# Patient Record
Sex: FEMALE | Race: WHITE | Employment: UNEMPLOYED | URBAN - METROPOLITAN AREA
[De-identification: names, ages, dates, MRNs, and addresses within clinical notes are randomized per-mention and may not be internally consistent; named-entity substitution may affect disease eponyms.]

---

## 2017-01-02 ENCOUNTER — PATIENT MESSAGE (OUTPATIENT)
Dept: INTERNAL MEDICINE CLINIC | Age: 52
End: 2017-01-02

## 2017-01-02 DIAGNOSIS — N39.3 STRESS INCONTINENCE: ICD-10-CM

## 2017-01-03 RX ORDER — OXYBUTYNIN CHLORIDE 5 MG/1
5 TABLET ORAL DAILY
Qty: 30 TAB | Refills: 11 | Status: SHIPPED | OUTPATIENT
Start: 2017-01-03 | End: 2018-01-18 | Stop reason: SDUPTHER

## 2017-01-05 DIAGNOSIS — Z12.31 ENCOUNTER FOR SCREENING MAMMOGRAM FOR BREAST CANCER: ICD-10-CM

## 2017-01-24 RX ORDER — DICLOFENAC SODIUM 75 MG/1
75 TABLET, DELAYED RELEASE ORAL 2 TIMES DAILY
COMMUNITY
Start: 2017-01-24 | End: 2017-04-19

## 2017-04-19 ENCOUNTER — OFFICE VISIT (OUTPATIENT)
Dept: INTERNAL MEDICINE CLINIC | Age: 52
End: 2017-04-19

## 2017-04-19 ENCOUNTER — HOSPITAL ENCOUNTER (OUTPATIENT)
Dept: LAB | Age: 52
Discharge: HOME | End: 2017-04-19
Payer: MEDICARE

## 2017-04-19 VITALS
TEMPERATURE: 98 F | HEART RATE: 93 BPM | OXYGEN SATURATION: 96 % | RESPIRATION RATE: 21 BRPM | BODY MASS INDEX: 41.23 KG/M2 | HEIGHT: 61 IN | DIASTOLIC BLOOD PRESSURE: 76 MMHG | SYSTOLIC BLOOD PRESSURE: 126 MMHG | WEIGHT: 218.4 LBS

## 2017-04-19 DIAGNOSIS — E03.9 HYPOTHYROIDISM, UNSPECIFIED TYPE: Primary | ICD-10-CM

## 2017-04-19 DIAGNOSIS — E66.01 MORBID OBESITY DUE TO EXCESS CALORIES (HCC): ICD-10-CM

## 2017-04-19 DIAGNOSIS — R73.09 ELEVATED GLUCOSE: ICD-10-CM

## 2017-04-19 DIAGNOSIS — Z51.81 MEDICATION MONITORING ENCOUNTER: ICD-10-CM

## 2017-04-19 PROCEDURE — 84443 ASSAY THYROID STIM HORMONE: CPT

## 2017-04-19 PROCEDURE — 80053 COMPREHEN METABOLIC PANEL: CPT

## 2017-04-19 PROCEDURE — 83036 HEMOGLOBIN GLYCOSYLATED A1C: CPT

## 2017-04-19 PROCEDURE — 85027 COMPLETE CBC AUTOMATED: CPT

## 2017-04-19 RX ORDER — DOXYCYCLINE 100 MG/1
100 CAPSULE ORAL 2 TIMES DAILY
COMMUNITY
End: 2017-11-15 | Stop reason: SDUPTHER

## 2017-04-19 NOTE — PROGRESS NOTES
VIVI Ford is a 45 y.o. female, she presents today for:  Follow-up of weight, hypothyroid. Today Barbara  is again accompanied by her mother. They are in good health and she is feeling that her walking and movement is improved since hip replacement. She continues to feel limited by her right knee pain but less so. She would like to lose weight. Has worked on drinking mainly water. She has 3 prepared meals per day and she generally finishs each plate (served by someone else). She is walking every other day and doing her hip exercises. PMH/PSH: reviewed and updated  Sochx:  reports that she has never smoked. She has never used smokeless tobacco. She reports that she does not drink alcohol or use illicit drugs. Famhx: reviewed and updated     All: No Known Allergies  Med:   Current Outpatient Prescriptions   Medication Sig    doxycycline (VIBRAMYCIN) 100 mg capsule Take 100 mg by mouth two (2) times a day.  oxybutynin (DITROPAN) 5 mg tablet Take 1 Tab by mouth daily.  cloZAPine (CLOZARIL) 100 mg tablet Take 4 Tabs by mouth nightly.  cholecalciferol (VITAMIN D3) 1,000 unit tablet Take 1 Tab by mouth daily.  docusate sodium (COLACE) 100 mg capsule Take 1 Cap by mouth two (2) times a day.  levothyroxine (LEVOXYL) 75 mcg tablet Take 1 Tab by mouth Daily (before breakfast).  polyethylene glycol (MIRALAX) 17 gram/dose powder Take 17 g by mouth two (2) times a day. Hold for lose stool    citalopram (CELEXA) 20 mg tablet Take 1 Tab by mouth daily.  lamoTRIgine (LAMICTAL) 150 mg tablet Take 150 mg by mouth nightly.  lithium carbonate (LITHOBID) 600 mg capsule Take 600 mg by mouth nightly. No current facility-administered medications for this visit. Review of Systems   Constitutional: Negative for chills, fever and weight loss. HENT: Negative for congestion. Respiratory: Negative for cough, shortness of breath and wheezing.     Cardiovascular: Negative for chest pain and leg swelling. Gastrointestinal: Negative for abdominal pain, diarrhea, nausea and vomiting. Genitourinary: Negative for dysuria. Skin: Negative for rash. Neurological: Negative for dizziness and headaches. PE:  Blood pressure 126/76, pulse 93, temperature 98 °F (36.7 °C), temperature source Oral, resp. rate 21, height 5' 1\" (1.549 m), weight 218 lb 6.4 oz (99.1 kg), last menstrual period 06/24/2013, SpO2 96 %. Body mass index is 41.27 kg/(m^2). Physical Exam   Constitutional: She is oriented to person, place, and time. She appears well-developed and well-nourished. No distress. HENT:   Head: Normocephalic. Mouth/Throat: Oropharynx is clear and moist.   Eyes: Conjunctivae and EOM are normal.   Neck: Neck supple. Cardiovascular: Normal rate, regular rhythm and normal heart sounds. Pulmonary/Chest: Effort normal and breath sounds normal.   Neurological: She is alert and oriented to person, place, and time. Skin: Skin is warm and dry. Nursing note and vitals reviewed. moving up and down to table much more easily  Labs:   No results found for any visits on 04/19/17. A/P:  45 y.o. female    ICD-10-CM ICD-9-CM    1. Hypothyroidism, unspecified type E03.9 244.9 TSH 3RD GENERATION   2. Medication monitoring encounter M22.24 I26.74 METABOLIC PANEL, COMPREHENSIVE      CBC W/O DIFF   3. Elevated glucose R73.09 790.29 HEMOGLOBIN A1C WITH EAG   4. Morbid obesity due to excess calories ([facility]) Q93.37 522.97 METABOLIC PANEL, COMPREHENSIVE      CBC W/O DIFF     Hypothyroid: on stable dose of levothyroxine. TSH ordered today given weight gain    Glucose intolerance: mild elevation in A1C likely indicating glucose intolerance. Will continue to monitor today. Morbid obesity: working to cut down on calories. Discussed cutting back on dinner portion and avoiding after dinner snack. Encouarged increased activity. Theses are both hard for Hiwot Herb since her meals are prepared.      Follow-up Disposition:  Return in about 7 months (around 11/19/2017) for well visit.

## 2017-04-19 NOTE — PATIENT INSTRUCTIONS
- Please request that your dinner portion be reduced by about half. - Continue to avoid sugar in your beverages, drink mainly water. - Keep up the good job with your hip exercises. - Try to add in walking every day. - If you can also take 10 minute \"get moving\" breaks like dancing to music, walking in place or doing toe touches that will help get your body stronger and moving. (Please consider scheduling well woman with pap smear with Dr. Abbie Quintero)     Learning About Cutting Calories  How do calories affect your weight? Food gives your body energy. Energy from the food you eat is measured in calories. This energy keeps your heart beating, your brain active, and your muscles working. Your body needs a certain number of calories each day. After your body uses the calories it needs, it stores extra calories as fat. To lose weight safely, you have to eat fewer calories while eating in a healthy way. How many calories do you need each day? The more active you are, the more calories you need. When you are less active, you need fewer calories. How many calories you need each day also depends on several things, including your age and whether you are male or female. Here are some general guidelines for adults:  · Less active women and older adults need 1,600 to 2,000 calories each day. · Active women and less active men need 2,000 to 2,400 calories each day. · Active men need 2,400 to 3,000 calories each day. How can you cut calories and eat healthy meals? Whole grains, vegetables and fruits, and dried beans are good lower-calorie foods. They give you lots of nutrients and fiber. And they fill you up. Sweets, energy drinks, and soda pop are high in calories. They give you few nutrients and no fiber. Try to limit soda pop, fruit juice, and energy drinks. Drink water instead. Some fats can be part of a healthy diet.  But cutting back on fats from highly processed foods like fast foods and many snack foods is a good way to lower the calories in your diet. Also, use smaller amounts of fats like butter, margarine, salad dressing, and mayonnaise. Add fresh garlic, lemon, or herbs to your meals to add flavor without adding fat. Meats and dairy products can be a big source of hidden fats. Try to choose lean or low-fat versions of these products. Fat-free cookies, candies, chips, and frozen treats can still be high in sugar and calories. Some fat-free foods have more calories than regular ones. Eat fat-free treats in moderation, as you would other foods. If your favorite foods are high in fat, salt, sugar, or calories, limit how often you eat them. Eat smaller servings, or look for healthy substitutes. Fill up on fruits, vegetables, and whole grains. Eating at home  · Use meat as a side dish instead of as the main part of your meal.  · Try main dishes that use whole wheat pasta, brown rice, dried beans, or vegetables. · Find ways to cook with little or no fat, such as broiling, steaming, or grilling. · Use cooking spray instead of oil. If you use oil, use a monounsaturated oil, such as canola or olive oil. · Trim fat from meats before you cook them. · Drain off fat after you brown the meat or while you roast it. · Chill soups and stews after you cook them. Then skim the fat off the top after it hardens. Eating out  · Order foods that are broiled or poached rather than fried or breaded. · Cut back on the amount of butter or margarine that you use on bread. · Order sauces, gravies, and salad dressings on the side, and use only a little. · When you order pasta, choose tomato sauce rather than cream sauce. · Ask for salsa with your baked potato instead of sour cream, butter, cheese, or wayne. · Order meals in a small size instead of upgrading to a large. · Share an entree, or take part of your food home to eat as another meal.  · Share appetizers and desserts. Where can you learn more?   Go to http://heather.info/. Enter [phone] [id number] in the search box to learn more about \"Learning About Cutting Calories. \"  Current as of: November 9, 2016  Content Version: 11.2  © 5555-8237 [employer]. Care instructions adapted under license by [employer] (which disclaims liability or warranty for this information). If you have questions about a medical condition or this instruction, always ask your healthcare professional. [place] [address] any warranty or liability for your use of this information.

## 2017-04-20 DIAGNOSIS — E07.9 THYROID DISEASE: Primary | ICD-10-CM

## 2017-04-20 LAB
ALBUMIN SERPL-MCNC: 4.4 G/DL (ref 3.5–5.5)
ALBUMIN/GLOB SERPL: 1.9 {RATIO} (ref 1.2–2.2)
ALP SERPL-CCNC: 129 IU/L (ref 39–117)
ALT SERPL-CCNC: 21 IU/L (ref 0–32)
AST SERPL-CCNC: 17 IU/L (ref 0–40)
BILIRUB SERPL-MCNC: 0.4 MG/DL (ref 0–1.2)
BUN SERPL-MCNC: 13 MG/DL (ref 6–24)
BUN/CREAT SERPL: 15 (ref 9–23)
CALCIUM SERPL-MCNC: 9.9 MG/DL (ref 8.7–10.2)
CHLORIDE SERPL-SCNC: 103 MMOL/L (ref 96–106)
CO2 SERPL-SCNC: 26 MMOL/L (ref 18–29)
CREAT SERPL-MCNC: 0.85 MG/DL (ref 0.57–1)
ERYTHROCYTE [DISTWIDTH] IN BLOOD BY AUTOMATED COUNT: 15 % (ref 12.3–15.4)
EST. AVERAGE GLUCOSE BLD GHB EST-MCNC: 114 MG/DL
GLOBULIN SER CALC-MCNC: 2.3 G/DL (ref 1.5–4.5)
GLUCOSE SERPL-MCNC: 103 MG/DL (ref 65–99)
HBA1C MFR BLD: 5.6 % (ref 4.8–5.6)
HCT VFR BLD AUTO: 40.7 % (ref 34–46.6)
HGB BLD-MCNC: 12.7 G/DL (ref 11.1–15.9)
MCH RBC QN AUTO: 28.7 PG (ref 26.6–33)
MCHC RBC AUTO-ENTMCNC: 31.2 G/DL (ref 31.5–35.7)
MCV RBC AUTO: 92 FL (ref 79–97)
PLATELET # BLD AUTO: 207 X10E3/UL (ref 150–379)
POTASSIUM SERPL-SCNC: 4.2 MMOL/L (ref 3.5–5.2)
PROT SERPL-MCNC: 6.7 G/DL (ref 6–8.5)
RBC # BLD AUTO: 4.42 X10E6/UL (ref 3.77–5.28)
SODIUM SERPL-SCNC: 144 MMOL/L (ref 134–144)
TSH SERPL DL<=0.005 MIU/L-ACNC: 3.71 UIU/ML (ref 0.45–4.5)
WBC # BLD AUTO: 7.7 X10E3/UL (ref 3.4–10.8)

## 2017-04-20 RX ORDER — LEVOTHYROXINE SODIUM 88 UG/1
88 TABLET ORAL
Qty: 30 TAB | Refills: 11 | Status: SHIPPED | OUTPATIENT
Start: 2017-04-20 | End: 2018-04-18 | Stop reason: SDUPTHER

## 2017-08-14 ENCOUNTER — PATIENT MESSAGE (OUTPATIENT)
Dept: INTERNAL MEDICINE CLINIC | Age: 52
End: 2017-08-14

## 2017-08-14 DIAGNOSIS — K59.00 CONSTIPATION, UNSPECIFIED CONSTIPATION TYPE: ICD-10-CM

## 2017-08-15 RX ORDER — POLYETHYLENE GLYCOL 3350 17 G/17G
17 POWDER, FOR SOLUTION ORAL 2 TIMES DAILY
Qty: 850 G | Refills: 11 | Status: SHIPPED | OUTPATIENT
Start: 2017-08-15 | End: 2018-04-18 | Stop reason: SDUPTHER

## 2017-08-17 ENCOUNTER — PATIENT MESSAGE (OUTPATIENT)
Dept: INTERNAL MEDICINE CLINIC | Age: 52
End: 2017-08-17

## 2017-08-17 DIAGNOSIS — L60.2 HYPERTROPHIC TOENAIL: Primary | ICD-10-CM

## 2017-08-18 NOTE — TELEPHONE ENCOUNTER
From: Lina Barrera  To: Rojelio Mosquera MD  Sent: 8/17/2017 10:49 AM EDT  Subject: Referral Request    Good morning, Doctor. Thanks for authorizing the Miralax for [place]. Unfortunately, [facility] sent TWO containers, one brand name and one generic. ... Would you please fax a referral for [place] to Dr. Cassie Lira (podiatrist) Jo. Fax#: [phone]. She needs one for her upcoming appt on 9/26. Thank you! I hope all is well.  ZAIRA.

## 2017-08-23 ENCOUNTER — TELEPHONE (OUTPATIENT)
Dept: INTERNAL MEDICINE CLINIC | Age: 52
End: 2017-08-23

## 2017-08-23 NOTE — TELEPHONE ENCOUNTER
----- Message from [facility] ADELAIDA Majano sent at 8/18/2017  1:09 PM EDT -----  Regarding: RE: Referral Request  Contact: [phone]  Thank you very much! ANNIKA  ----- Message -----  From: Rojelio Mosquera MD  Sent: 8/18/2017 12:26 PM EDT  To: [facility] ADELAIDA Gretel  Subject: RE: Referral Request    I went ahead and gave the referral information to our coordinator so we can get approval.     Esvin Melo      ----- Message -----     From: [facility] BALTAZARVenancio Arndt     Sent: 8/17/2017 10:49 AM EDT       To: Rojelio Mosquera MD  Subject: Referral Request    Good morning, Doctor. Thanks for authorizing the Miralax for [place]. Unfortunately, [facility] sent TWO containers, one brand name and one generic. ... Would you please fax a referral for [place] to Dr. Cassie Lira (podiatrist) Jo. Fax#: [phone]. She needs one for her upcoming appt on 9/26. Thank you! I hope all is well.  ELBERT

## 2017-10-17 DIAGNOSIS — K59.00 CONSTIPATION, UNSPECIFIED CONSTIPATION TYPE: ICD-10-CM

## 2017-10-17 NOTE — TELEPHONE ENCOUNTER
[address] [facility]  [phone]    [facility] is requesting a refill of Colace 100 mg capsule    LOV 04/19/17  NOv 11/15/17

## 2017-10-19 RX ORDER — DOCUSATE SODIUM 100 MG/1
100 CAPSULE, LIQUID FILLED ORAL 2 TIMES DAILY
Qty: 60 CAP | Refills: 11 | Status: SHIPPED | OUTPATIENT
Start: 2017-10-19 | End: 2018-04-18 | Stop reason: SDUPTHER

## 2017-11-15 ENCOUNTER — HOSPITAL ENCOUNTER (OUTPATIENT)
Dept: LAB | Age: 52
Discharge: HOME | End: 2017-11-15
Payer: MEDICARE

## 2017-11-15 ENCOUNTER — OFFICE VISIT (OUTPATIENT)
Dept: INTERNAL MEDICINE CLINIC | Age: 52
End: 2017-11-15

## 2017-11-15 VITALS
HEIGHT: 61 IN | BODY MASS INDEX: 41.91 KG/M2 | TEMPERATURE: 98.1 F | WEIGHT: 222 LBS | HEART RATE: 94 BPM | OXYGEN SATURATION: 92 % | RESPIRATION RATE: 24 BRPM | DIASTOLIC BLOOD PRESSURE: 66 MMHG | SYSTOLIC BLOOD PRESSURE: 102 MMHG

## 2017-11-15 DIAGNOSIS — F33.42 RECURRENT MAJOR DEPRESSIVE DISORDER, IN FULL REMISSION (HCC): ICD-10-CM

## 2017-11-15 DIAGNOSIS — M17.32 POST-TRAUMATIC OSTEOARTHRITIS OF LEFT KNEE: ICD-10-CM

## 2017-11-15 DIAGNOSIS — F31.9 BIPOLAR 1 DISORDER (HCC): ICD-10-CM

## 2017-11-15 DIAGNOSIS — E03.9 HYPOTHYROIDISM, UNSPECIFIED TYPE: ICD-10-CM

## 2017-11-15 DIAGNOSIS — Z00.00 MEDICARE ANNUAL WELLNESS VISIT, SUBSEQUENT: Primary | ICD-10-CM

## 2017-11-15 DIAGNOSIS — E55.9 VITAMIN D DEFICIENCY: ICD-10-CM

## 2017-11-15 DIAGNOSIS — Z87.820 HISTORY OF TRAUMATIC BRAIN INJURY: ICD-10-CM

## 2017-11-15 DIAGNOSIS — Z80.0 FAMILY HISTORY OF COLON CANCER: ICD-10-CM

## 2017-11-15 DIAGNOSIS — Z23 ENCOUNTER FOR IMMUNIZATION: ICD-10-CM

## 2017-11-15 DIAGNOSIS — G47.9 SLEEP DISORDER: ICD-10-CM

## 2017-11-15 DIAGNOSIS — K59.00 CONSTIPATION, UNSPECIFIED CONSTIPATION TYPE: ICD-10-CM

## 2017-11-15 DIAGNOSIS — Z13.220 SCREENING FOR LIPID DISORDERS: ICD-10-CM

## 2017-11-15 DIAGNOSIS — R73.01 ELEVATED FASTING GLUCOSE: ICD-10-CM

## 2017-11-15 DIAGNOSIS — M16.9 OSTEOARTHRITIS OF HIP, UNSPECIFIED LATERALITY, UNSPECIFIED OSTEOARTHRITIS TYPE: ICD-10-CM

## 2017-11-15 DIAGNOSIS — E66.01 MORBID OBESITY (HCC): ICD-10-CM

## 2017-11-15 PROCEDURE — 84443 ASSAY THYROID STIM HORMONE: CPT

## 2017-11-15 PROCEDURE — 80061 LIPID PANEL: CPT

## 2017-11-15 PROCEDURE — 83036 HEMOGLOBIN GLYCOSYLATED A1C: CPT

## 2017-11-15 PROCEDURE — 82306 VITAMIN D 25 HYDROXY: CPT

## 2017-11-15 PROCEDURE — 80053 COMPREHEN METABOLIC PANEL: CPT

## 2017-11-15 PROCEDURE — 80178 ASSAY OF LITHIUM: CPT

## 2017-11-15 NOTE — PROGRESS NOTES
VIVI Ford is a 45 y.o. female, she presents today for:    Constipation: currently well controlled. Feels that she eats to much. Notes that she is drinking ice tea and water. Takes walk every day for exercise. Takes oxybutin for bladder. No bladder infections. Going to dentist today. Feel safe in household. Lives in group home. Is supported by mother (who is having some health concerns). PMH/PSH: reviewed and updated  Sochx:  reports that she has never smoked. She has never used smokeless tobacco. She reports that she does not drink alcohol or use illicit drugs. Famhx: reviewed and updated     All: No Known Allergies  Med:   Current Outpatient Prescriptions   Medication Sig    docusate sodium (COLACE) 100 mg capsule Take 1 Cap by mouth two (2) times a day.  polyethylene glycol (MIRALAX) 17 gram/dose powder Take 17 g by mouth two (2) times a day. Hold for lose stool    levothyroxine (SYNTHROID) 88 mcg tablet Take 1 Tab by mouth Daily (before breakfast).  oxybutynin (DITROPAN) 5 mg tablet Take 1 Tab by mouth daily.  cloZAPine (CLOZARIL) 100 mg tablet Take 4 Tabs by mouth nightly.  cholecalciferol (VITAMIN D3) 1,000 unit tablet Take 1 Tab by mouth daily.  citalopram (CELEXA) 20 mg tablet Take 1 Tab by mouth daily.  lamoTRIgine (LAMICTAL) 150 mg tablet Take 150 mg by mouth nightly.  lithium carbonate (LITHOBID) 600 mg capsule Take 600 mg by mouth nightly.  doxycycline (VIBRAMYCIN) 100 mg capsule Take 100 mg by mouth two (2) times a day. No current facility-administered medications for this visit. Review of Systems   Constitutional: Negative for chills and fever. HENT: Negative for hearing loss. Respiratory: Negative for cough, shortness of breath and wheezing. Cardiovascular: Negative for chest pain. Gastrointestinal: Negative for abdominal pain, heartburn, nausea and vomiting. Musculoskeletal: Negative for myalgias.         Knee pain, though improved from prior   Neurological: Negative for dizziness and headaches. PE:  Blood pressure 102/66, pulse 94, temperature 98.1 °F (36.7 °C), temperature source Oral, resp. rate 24, height 5' 1\" (1.549 m), weight 222 lb (100.7 kg), last menstrual period 06/24/2013, SpO2 92 %. Body mass index is 41.95 kg/(m^2). Physical Exam   Constitutional: She is oriented to person, place, and time. She appears well-developed and well-nourished. No distress. HENT:   Head: Normocephalic. Mouth/Throat: Oropharynx is clear and moist.   Eyes: Conjunctivae and EOM are normal.   Neck: Neck supple. No tracheal deviation present. No thyromegaly present. Cardiovascular: Normal rate, regular rhythm and normal heart sounds. Pulmonary/Chest: Effort normal and breath sounds normal.   Abdominal: Soft. Bowel sounds are normal. She exhibits no distension. Musculoskeletal: She exhibits no edema. Lymphadenopathy:     She has no cervical adenopathy. Neurological: She is alert and oriented to person, place, and time. Skin: Skin is warm and dry. Psychiatric:   Flat affect. Oriented to self, time and place. Overall quiet but answers questions appropriately, normal thought content. Pleasant and cooperative. No aggitation. Nursing note and vitals reviewed. Labs: see addendum    A/P:  45 y.o. female    ICD-10-CM ICD-9-CM    1. Hypothyroidism, unspecified type E03.9 244.9 TSH 3RD GENERATION   2. Post-traumatic osteoarthritis of left knee M17.32 715.26    3. Vitamin D deficiency E55.9 268.9 VITAMIN D, 25 HYDROXY   4. Constipation, unspecified constipation type K59.00 564.00    5. Bipolar 1 disorder (HCC) F31.9 296.7 LITHIUM      METABOLIC PANEL, COMPREHENSIVE   6. Morbid obesity (HCC) L92.95 585.09 METABOLIC PANEL, COMPREHENSIVE   7. Recurrent major depressive disorder, in full remission ([facility] E32.08 993.69 METABOLIC PANEL, COMPREHENSIVE   8.  Elevated fasting glucose R73.01 790.21 HEMOGLOBIN A1C WITH EAG METABOLIC PANEL, COMPREHENSIVE   9. Sleep disorder G47.9 780.50    10. Family history of colon cancer Z80.0 V16.0    6. History of traumatic brain injury Z87.820 V15.52    12. Osteoarthritis of hip, unspecified laterality, unspecified osteoarthritis type M16.9 715.95    13. Medicare annual wellness visit, subsequent Z00.00 V70.0    14. Encounter for immunization Z23 V03.89    15. Screening for lipid disorders Z13.220 V77.91 LIPID PANEL     Hypothyroid: on stable dose of levothyroxine. TSH requested for monitoring.     Elevated A1C:  Mild, not on metformin. Continue to focus on avoiding excess sugar, exercise and weight control.      Morbid obesity: This is a long term struggle for Enrrique. While she is aware that she needs to eat less, it is difficult in her setting and situation. Vit D deficiency: repeat level to ensure at goal.     Bipolar type 1 disorder: stable and followed with psychiatrist.    Left knee osteoarthritis: improved movement and pain after left hip replacement. Continue to follow-up with Dr. Suhas Thompson as needed. - She was given AVS and expressed understanding with the diagnosis and plan as discussed. Follow-up Disposition:  Return in about 6 months (around 5/15/2018) for follow-up hypothyroid, weight. .  Future Appointments  Date Time Provider Mery Brown   4/18/2018 8:30 AM Bay Espinosa MD [facility]       This is a Subsequent Medicare Annual Wellness Exam (AWV) (Performed 12 months after IPPE or effective date of Medicare Part B enrollment)    I have reviewed the patient's medical history in detail and updated the computerized patient record. History     Past Medical History:   Diagnosis Date    Arthritis     Bipolar 1 disorder ([facility] 12/17/2012    Cervical spine fracture (HCC)     Cervical spine fracture (HCC)     Cyst of breast, right, solitary 11/18/2015    mammogram from 11/13/2015, Sue Patient found to have complex cyst in right breast measuring 8mm. 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 60 Cap 11    polyethylene glycol (MIRALAX) 17 gram/dose powder Take 17 g by mouth two (2) times a day. Hold for lose stool 850 g 11    levothyroxine (SYNTHROID) 88 mcg tablet Take 1 Tab by mouth Daily (before breakfast). 30 Tab 11    oxybutynin (DITROPAN) 5 mg tablet Take 1 Tab by mouth daily. 30 Tab 11    cloZAPine (CLOZARIL) 100 mg tablet Take 4 Tabs by mouth nightly.  cholecalciferol (VITAMIN D3) 1,000 unit tablet Take 1 Tab by mouth daily. 30 Tab 11    citalopram (CELEXA) 20 mg tablet Take 1 Tab by mouth daily. 30 Tab 5    lamoTRIgine (LAMICTAL) 150 mg tablet Take 150 mg by mouth nightly.  lithium carbonate (LITHOBID) 600 mg capsule Take 600 mg by mouth nightly.  doxycycline (VIBRAMYCIN) 100 mg capsule Take 100 mg by mouth two (2) times a day.        No Known Allergies  Family History   Problem Relation Age of Onset    Asthma Mother     Asthma Father    [employer] Alzheimer Father     No Known Problems Brother      Social History   Substance Use Topics    Smoking status: Never Smoker    Smokeless tobacco: Never Used    Alcohol use No     Patient Active Problem List   Diagnosis Code    Hypothyroidism E03.9    Sleep disorder G47.9    Bipolar 1 disorder ([facility] F31.9    Vitamin D deficiency E55.9    OA (osteoarthritis) of knee M17.10    Constipation K59.00    Morbid obesity ([facility] E66.01    Elevated fasting glucose R73.01    Degenerative joint disease (DJD) of hip M16.9    History of traumatic brain injury Z87.820    Family history of colon cancer Z80.0    Depression F32.9       Depression Risk Factor Screening:     PHQ over the last two weeks 11/15/2017   Little interest or pleasure in doing things Not at all   Feeling down, depressed or hopeless Not at all   Total Score PHQ 2 0     Alcohol Risk Factor Screening: You do not drink alcohol or very rarely. Functional Ability and Level of Safety:   Hearing Loss  Hearing is good. Activities of Daily Living  The home contains: no safety equipment. Patient does total self care    Fall Risk  Fall Risk Assessment, last 12 mths 11/15/2017   Able to walk? Yes   Fall in past 12 months? No     Abuse Screen  Patient is not abused  feels safe in current environment. has support through family as well as group home    Cognitive Screening   Evaluation of Cognitive Function:  Has your family/caregiver stated any concerns about your memory: no  Baseline cognitive changes since head trauma and related to mental health.      Patient Care Team   Patient Care Team:  Julian Mace MD as PCP - General (Internal Medicine)  ALICIA Mann MD (Orthopedic Surgery)    Assessment/Plan   Education and counseling provided:  Are appropriate based on today's review and evaluation  Influenza Vaccine  Screening Mammography  Screening Pap and pelvic (covered once every 2 years)  Colorectal cancer screening tests    Well woman (non-gyn) exam: history and exam revealed issues as noted below. Cancer screening: mammo and pap done with gyn. Colonoscopy done  Vaccine status: up to date   Cardiovascular risk: BP well controlled, lipid screen. Bone health: daily vit D supplement.    Diet and Exercise: not drinking sugary beverages    Health Maintenance Due   Topic Date Due    PAP AKA CERVICAL CYTOLOGY  10/20/2017

## 2017-11-15 NOTE — PATIENT INSTRUCTIONS
Medicare Wellness Visit, Female    The best way to live healthy is to have a healthy lifestyle by eating a well-balanced diet, exercising regularly, limiting alcohol and stopping smoking. Regular physical exams and screening tests are another way to keep healthy. Preventive exams provided by your health care provider can find health problems before they become diseases or illnesses. Preventive services including immunizations, screening tests, monitoring and exams can help you take care of your own health. All people over age 72 should have a pneumovax  and and a prevnar shot to prevent pneumonia. These are once in a lifetime unless you and your provider decide differently. All people over 65 should have a yearly flu shot and a tetanus vaccine every 10 years. A bone mass density to screen for osteoporosis or thinning of the bones should be done every 2 years after 65. Screening for diabetes mellitus with a blood sugar test should be done every year. Glaucoma is a disease of the eye due to increased ocular pressure that can lead to blindness and it should be done every year by an eye professional.    Cardiovascular screening tests that check for elevated lipids (fatty part of blood) which can lead to heart disease and strokes should be done every 5 years. Colorectal screening that evaluates for blood or polyps in your colon should be done yearly as a stool test or every five years as a flexible sigmoidoscope or every 10 years as a colonoscopy up to age 76. Breast cancer screening with a mammogram is recommended biennially  for women age 54-69. Screening for cervical cancer with a pap smear and pelvic exam is recommended for women after age 72 years every 2 years up to age 79 or when the provider and patient decide to stop. If there is a history of cervical abnormalities or other increased risk for cancer then the test is recommended yearly.     Hepatitis C screening is also recommended for anyone born between [birth year] through [address]. A shingles vaccine is also recommended once in a lifetime after age 61. Your Medicare Wellness Exam is recommended annually. Here is a list of your current Health Maintenance items with a due date:  Health Maintenance Due   Topic Date Due    Cervical Cancer Screening  10/20/2017          Knee Arthritis: Exercises  Your Care Instructions  Here are some examples of exercises for knee arthritis. Start each exercise slowly. Ease off the exercise if you start to have pain. Your doctor or physical therapist will tell you when you can start these exercises and which ones will work best for you. How to do the exercises  Knee flexion with heel slide    1. Lie on your back with your knees bent. 2. Slide your heel back by bending your affected knee as far as you can. Then hook your other foot around your ankle to help pull your heel even farther back. 3. Hold for about 6 seconds, then rest for up to 10 seconds. 4. Repeat 8 to 12 times. 5. Switch legs and repeat steps 1 through 4, even if only one knee is sore. Quad sets    1. Sit with your affected leg straight and supported on the floor or a firm bed. Place a small, rolled-up towel under your knee. Your other leg should be bent, with that foot flat on the floor. 2. Tighten the thigh muscles of your affected leg by pressing the back of your knee down into the towel. 3. Hold for about 6 seconds, then rest for up to 10 seconds. 4. Repeat 8 to 12 times. 5. Switch legs and repeat steps 1 through 4, even if only one knee is sore. Straight-leg raises to the front    1. Lie on your back with your good knee bent so that your foot rests flat on the floor. Your affected leg should be straight. Make sure that your low back has a normal curve. You should be able to slip your hand in between the floor and the small of your back, with your palm touching the floor and your back touching the back of your hand.   2. Tighten the thigh muscles in your affected leg by pressing the back of your knee flat down to the floor. Hold your knee straight. 3. Keeping the thigh muscles tight and your leg straight, lift your affected leg up so that your heel is about 12 inches off the floor. Hold for about 6 seconds, then lower slowly. 4. Relax for up to 10 seconds between repetitions. 5. Repeat 8 to 12 times. 6. Switch legs and repeat steps 1 through 5, even if only one knee is sore. Active knee flexion    1. Lie on your stomach with your knees straight. If your kneecap is uncomfortable, roll up a washcloth and put it under your leg just above your kneecap. 2. Lift the foot of your affected leg by bending the knee so that you bring the foot up toward your buttock. If this motion hurts, try it without bending your knee quite as far. This may help you avoid any painful motion. 3. Slowly move your leg up and down. 4. Repeat 8 to 12 times. 5. Switch legs and repeat steps 1 through 4, even if only one knee is sore. Quadriceps stretch (facedown)    1. Lie flat on your stomach, and rest your face on the floor. 2. Wrap a towel or belt strap around the lower part of your affected leg. Then use the towel or belt strap to slowly pull your heel toward your buttock until you feel a stretch. 3. Hold for about 15 to 30 seconds, then relax your leg against the towel or belt strap. 4. Repeat 2 to 4 times. 5. Switch legs and repeat steps 1 through 4, even if only one knee is sore. Stationary exercise bike    1. If you do not have a stationary exercise bike at home, you can find one to ride at your local health club or community center. 2. Adjust the height of the bike seat so that your knee is slightly bent when your leg is extended downward. If your knee hurts when the pedal reaches the top, you can raise the seat so that your knee does not bend as much. 3. Start slowly. At first, try to do 5 to 10 minutes of cycling with little to no resistance.  Then increase your time and the resistance bit by bit until you can do 20 to 30 minutes without pain. 4. If you start to have pain, rest your knee until your pain gets back to the level that is normal for you. Or cycle for less time or with less effort. Follow-up care is a key part of your treatment and safety. Be sure to make and go to all appointments, and call your doctor if you are having problems. It's also a good idea to know your test results and keep a list of the medicines you take. Where can you learn more? Go to http://juan-maryellne.info/. Enter C159 in the search box to learn more about \"Knee Arthritis: Exercises. \"  Current as of: March 21, 2017  Content Version: 11.4  © 5656-0649 [employer]. Care instructions adapted under license by [employer] (which disclaims liability or warranty for this information). If you have questions about a medical condition or this instruction, always ask your healthcare professional. [address] any warranty or liability for your use of this information.

## 2017-11-15 NOTE — LETTER
11/15/2017 9:19 AM 
 
Ms. Blair Riley 
[address] [place]  [id number] To Whom It May Concern: 
 
Blair Riley is currently under the care of [facility]. Current Outpatient Prescriptions on File Prior to Visit Medication Sig Dispense Refill  docusate sodium (COLACE) 100 mg capsule Take 1 Cap by mouth two (2) times a day. 60 Cap 11  
 polyethylene glycol (MIRALAX) 17 gram/dose powder Take 17 g by mouth two (2) times a day. Hold for lose stool 850 g 11  
 levothyroxine (SYNTHROID) 88 mcg tablet Take 1 Tab by mouth Daily (before breakfast). 30 Tab 11  
 oxybutynin (DITROPAN) 5 mg tablet Take 1 Tab by mouth daily. 30 Tab 11  
 cloZAPine (CLOZARIL) 100 mg tablet Take 4 Tabs by mouth nightly.  cholecalciferol (VITAMIN D3) 1,000 unit tablet Take 1 Tab by mouth daily. 30 Tab 11  
 citalopram (CELEXA) 20 mg tablet Take 1 Tab by mouth daily. 30 Tab 5  lamoTRIgine (LAMICTAL) 150 mg tablet Take 150 mg by mouth nightly.  lithium carbonate (LITHOBID) 600 mg capsule Take 600 mg by mouth nightly. No current facility-administered medications on file prior to visit. Past Medical History:  
Diagnosis Date  Arthritis  Bipolar 1 disorder ([facility] [address] 12/17/2012  Cervical spine fracture (HCC)  Cervical spine fracture (HCC)  Cyst of breast, right, solitary 11/18/2015  
 mammogram from 11/13/2015, Sue Patient found to have complex cyst in right breast measuring 8mm. Biopsy planned for 11/19/2015  Depression   
 psychotic  Diverticulosis 4/7/14  
 no polyps  Endocrine disease   
 hypothyoidism  Family history of colon cancer  Fibroids 10/14/2009  Fibrosis of uterus  Lumbar vertebral fracture (HCC)  Other and unspecified symptoms and signs involving general sensations and perceptions   
 syncope, BRAIN TRAUMA FROM FALL SEVEN YEARS AGO  Other ill-defined conditions(799.89)   
 uterine fibrosis  Patella fracture  Psychiatric disorder   
 depression, bipolar  Psychotic depressive reaction ([facility] [address] 10/14/2009  Syncope  TBI (traumatic brain injury) ([facility] [address]  Thyroid disease   
 hypothyroidism w functioning thyroid nodule If there are questions or concerns please have the patient contact our office.  
 
 
 
Sincerely, 
 
 
Elodia Burnett MD

## 2017-11-15 NOTE — MR AVS SNAPSHOT
Visit Information Date & Time Provider Department Dept. Phone Encounter #  
 11/15/2017  8:30 AM Jen Cosme MD [address] [place] [facility] [phone] [id number] Follow-up Instructions Return in about 6 months (around 5/15/2018) for follow-up hypothyroid, weight. Nancy Peralta Upcoming Health Maintenance Date Due  
 PAP AKA CERVICAL CYTOLOGY 10/20/2017 BREAST CANCER SCRN MAMMOGRAM 12/19/2018 COLONOSCOPY 4/7/2019 DTaP/Tdap/Td series (2 - Td) 4/20/2025 Allergies as of 11/15/2017  Review Complete On: 11/15/2017 By: Jen Cosme MD  
 No Known Allergies Current Immunizations  Reviewed on 11/15/2017 Name Date Influenza Vaccine 11/3/2014 Influenza Vaccine (Quad) PF 10/3/2016, 10/21/2015, 10/16/2013 PPD 10/14/2009 TB Skin Test (PPD) Intradermal 10/3/2016, 10/21/2015, 10/20/2014, 10/16/2013  9:30 AM  
 Tdap 4/20/2015 Reviewed by Jen Cosme MD on 11/15/2017 at  8:52 AM  
You Were Diagnosed With   
  
 Codes Comments Hypothyroidism, unspecified type    -  Primary ICD-10-CM: E03.9 ICD-9-CM: 244.9 Post-traumatic osteoarthritis of left knee     ICD-10-CM: M17.32 
ICD-9-CM: 715.26 Vitamin D deficiency     ICD-10-CM: E55.9 ICD-9-CM: 268.9 Constipation, unspecified constipation type     ICD-10-CM: K59.00 ICD-9-CM: 564.00 Bipolar 1 disorder (HCC)     ICD-10-CM: F31.9 ICD-9-CM: 296.7 Morbid obesity ([facility] [address]     ICD-10-CM: E66.01 
ICD-9-CM: 278.01 Recurrent major depressive disorder, in full remission ([facility] [address]     ICD-10-CM: F33.42 
ICD-9-CM: 296.36 Elevated fasting glucose     ICD-10-CM: R73.01 
ICD-9-CM: 790.21 Sleep disorder     ICD-10-CM: G47.9 ICD-9-CM: 780.50 Family history of colon cancer     ICD-10-CM: Z80.0 ICD-9-CM: V16.0 History of traumatic brain injury     ICD-10-CM: Z87.820 ICD-9-CM: V15.52  Osteoarthritis of hip, unspecified laterality, unspecified osteoarthritis type     ICD-10-CM: M16.9 ICD-9-CM: 715.95 Medicare annual wellness visit, subsequent     ICD-10-CM: Z00.00 ICD-9-CM: V70.0 Encounter for immunization     ICD-10-CM: A85 ICD-9-CM: V03.89 Screening for lipid disorders     ICD-10-CM: Z13.220 ICD-9-CM: V77.91 Vitals BP Pulse Temp Resp Height(growth percentile) Weight(growth percentile) 102/66 (BP 1 Location: Right arm, BP Patient Position: Sitting) 94 98.1 °F (36.7 °C) (Oral) 24 5' 1\" (1.549 m) 222 lb (100.7 kg) LMP SpO2 BMI OB Status Smoking Status 06/24/2013 92% 41.95 kg/m2 Postmenopausal Never Smoker BMI and BSA Data Body Mass Index Body Surface Area 41.95 kg/m 2 2.08 m 2 Preferred Pharmacy Pharmacy Name [phone] [facility] [phone] Your Updated Medication List  
  
   
This list is accurate as of: 11/15/17  9:14 AM.  Always use your most recent med list.  
  
  
  
  
 cholecalciferol 1,000 unit tablet Commonly known as:  VITAMIN D3 Take 1 Tab by mouth daily. citalopram 20 mg tablet Commonly known as:  Yogesh Inoue Take 1 Tab by mouth daily. cloZAPine 100 mg tablet Commonly known as:  CLOZARIL Take 4 Tabs by mouth nightly. docusate sodium 100 mg capsule Commonly known as:  Adrianne Alexia Take 1 Cap by mouth two (2) times a day. LaMICtal 150 mg tablet Generic drug:  lamoTRIgine Take 150 mg by mouth nightly. levothyroxine 88 mcg tablet Commonly known as:  LEVOXYL Take 1 Tab by mouth Daily (before breakfast). lithium carbonate 600 mg capsule Take 600 mg by mouth nightly. oxybutynin 5 mg tablet Commonly known as:  NDMVZMIE Take 1 Tab by mouth daily. polyethylene glycol 17 gram/dose powder Commonly known as:  Henrey Ely Take 17 g by mouth two (2) times a day. Hold for lose stool We Performed the Following HEMOGLOBIN A1C WITH EAG [99175 CPT(R)] LIPID PANEL [92812 CPT(R)] LITHIUM [id number] CPT(R)] METABOLIC PANEL, COMPREHENSIVE [23008 CPT(R)] TSH 3RD GENERATION [49091 CPT(R)] VITAMIN D, 25 HYDROXY [id number] CPT(R)] Follow-up Instructions Return in about 6 months (around 5/15/2018) for follow-up hypothyroid, weight. .  
  
  
Patient Instructions Medicare Wellness Visit, Female The best way to live healthy is to have a healthy lifestyle by eating a well-balanced diet, exercising regularly, limiting alcohol and stopping smoking. Regular physical exams and screening tests are another way to keep healthy. Preventive exams provided by your health care provider can find health problems before they become diseases or illnesses. Preventive services including immunizations, screening tests, monitoring and exams can help you take care of your own health. All people over age 72 should have a pneumovax  and and a prevnar shot to prevent pneumonia. These are once in a lifetime unless you and your provider decide differently. All people over 65 should have a yearly flu shot and a tetanus vaccine every 10 years. A bone mass density to screen for osteoporosis or thinning of the bones should be done every 2 years after 65. Screening for diabetes mellitus with a blood sugar test should be done every year. Glaucoma is a disease of the eye due to increased ocular pressure that can lead to blindness and it should be done every year by an eye professional. 
 
Cardiovascular screening tests that check for elevated lipids (fatty part of blood) which can lead to heart disease and strokes should be done every 5 years. Colorectal screening that evaluates for blood or polyps in your colon should be done yearly as a stool test or every five years as a flexible sigmoidoscope or every 10 years as a colonoscopy up to age 76. Breast cancer screening with a mammogram is recommended biennially  for women age 54-69. Screening for cervical cancer with a pap smear and pelvic exam is recommended for women after age 72 years every 2 years up to age 79 or when the provider and patient decide to stop. If there is a history of cervical abnormalities or other increased risk for cancer then the test is recommended yearly. Hepatitis C screening is also recommended for anyone born between 80 through [address]. A shingles vaccine is also recommended once in a lifetime after age 61. Your Medicare Wellness Exam is recommended annually. Here is a list of your current Health Maintenance items with a due date: 
Health Maintenance Due Topic Date Due  Cervical Cancer Screening  10/20/2017 Knee Arthritis: Exercises Your Care Instructions Here are some examples of exercises for knee arthritis. Start each exercise slowly. Ease off the exercise if you start to have pain. Your doctor or physical therapist will tell you when you can start these exercises and which ones will work best for you. How to do the exercises Knee flexion with heel slide 1. Lie on your back with your knees bent. 2. Slide your heel back by bending your affected knee as far as you can. Then hook your other foot around your ankle to help pull your heel even farther back. 3. Hold for about 6 seconds, then rest for up to 10 seconds. 4. Repeat 8 to 12 times. 5. Switch legs and repeat steps 1 through 4, even if only one knee is sore. Veset 1. Sit with your affected leg straight and supported on the floor or a firm bed. Place a small, rolled-up towel under your knee. Your other leg should be bent, with that foot flat on the floor. 2. Tighten the thigh muscles of your affected leg by pressing the back of your knee down into the towel. 3. Hold for about 6 seconds, then rest for up to 10 seconds. 4. Repeat 8 to 12 times. 5. Switch legs and repeat steps 1 through 4, even if only one knee is sore. Straight-leg raises to the front 1. Lie on your back with your good knee bent so that your foot rests flat on the floor. Your affected leg should be straight. Make sure that your low back has a normal curve. You should be able to slip your hand in between the floor and the small of your back, with your palm touching the floor and your back touching the back of your hand. 2. Tighten the thigh muscles in your affected leg by pressing the back of your knee flat down to the floor. Hold your knee straight. 3. Keeping the thigh muscles tight and your leg straight, lift your affected leg up so that your heel is about 12 inches off the floor. Hold for about 6 seconds, then lower slowly. 4. Relax for up to 10 seconds between repetitions. 5. Repeat 8 to 12 times. 6. Switch legs and repeat steps 1 through 5, even if only one knee is sore. Active knee flexion 1. Lie on your stomach with your knees straight. If your kneecap is uncomfortable, roll up a washcloth and put it under your leg just above your kneecap. 2. Lift the foot of your affected leg by bending the knee so that you bring the foot up toward your buttock. If this motion hurts, try it without bending your knee quite as far. This may help you avoid any painful motion. 3. Slowly move your leg up and down. 4. Repeat 8 to 12 times. 5. Switch legs and repeat steps 1 through 4, even if only one knee is sore. Quadriceps stretch (facedown) 1. Lie flat on your stomach, and rest your face on the floor. 2. Wrap a towel or belt strap around the lower part of your affected leg. Then use the towel or belt strap to slowly pull your heel toward your buttock until you feel a stretch. 3. Hold for about 15 to 30 seconds, then relax your leg against the towel or belt strap. 4. Repeat 2 to 4 times. 5. Switch legs and repeat steps 1 through 4, even if only one knee is sore. Stationary exercise bike 1.  If you do not have a stationary exercise bike at home, you can find one to ride at your local health club or community center. 2. Adjust the height of the bike seat so that your knee is slightly bent when your leg is extended downward. If your knee hurts when the pedal reaches the top, you can raise the seat so that your knee does not bend as much. 3. Start slowly. At first, try to do 5 to 10 minutes of cycling with little to no resistance. Then increase your time and the resistance bit by bit until you can do 20 to 30 minutes without pain. 4. If you start to have pain, rest your knee until your pain gets back to the level that is normal for you. Or cycle for less time or with less effort. Follow-up care is a key part of your treatment and safety. Be sure to make and go to all appointments, and call your doctor if you are having problems. It's also a good idea to know your test results and keep a list of the medicines you take. Where can you learn more? Go to http://juan-maryellen.info/. Enter C159 in the search box to learn more about \"Knee Arthritis: Exercises. \" Current as of: March 21, 2017 Content Version: 11.4 © 5085-2332 [employer]. Care instructions adapted under license by [employer] (which disclaims liability or warranty for this information). If you have questions about a medical condition or this instruction, always ask your healthcare professional. [address] any warranty or liability for your use of this information. Introducing [facility]! Dear [place]: 
Thank you for requesting a EasyProve account. Our records indicate that you already have an active EasyProve account. You can access your account anytime at [email or web address]. [email or web address] Did you know that you can access your hospital and ER discharge instructions at any time in EasyProve? You can also review all of your test results from your hospital stay or ER visit. Additional Information If you have questions, please visit the Frequently Asked Questions section of the [email or web address] website at [email or web address] Remember, [employer] is NOT to be used for urgent needs. For medical emergencies, dial 911. Now available from your iPhone and Android! Please provide this summary of care documentation to your next provider. Your primary care clinician is listed as Narciso Paul. If you have any questions after today's visit, please call [phone].

## 2017-11-16 LAB
25(OH)D3+25(OH)D2 SERPL-MCNC: 31 NG/ML (ref 30–100)
ALBUMIN SERPL-MCNC: 4.6 G/DL (ref 3.5–5.5)
ALBUMIN/GLOB SERPL: 2 {RATIO} (ref 1.2–2.2)
ALP SERPL-CCNC: 136 IU/L (ref 39–117)
ALT SERPL-CCNC: 23 IU/L (ref 0–32)
AST SERPL-CCNC: 18 IU/L (ref 0–40)
BILIRUB SERPL-MCNC: 0.4 MG/DL (ref 0–1.2)
BUN SERPL-MCNC: 9 MG/DL (ref 6–24)
BUN/CREAT SERPL: 10 (ref 9–23)
CALCIUM SERPL-MCNC: 10.1 MG/DL (ref 8.7–10.2)
CHLORIDE SERPL-SCNC: 102 MMOL/L (ref 96–106)
CHOLEST SERPL-MCNC: 159 MG/DL (ref 100–199)
CO2 SERPL-SCNC: 29 MMOL/L (ref 18–29)
CREAT SERPL-MCNC: 0.91 MG/DL (ref 0.57–1)
EST. AVERAGE GLUCOSE BLD GHB EST-MCNC: 114 MG/DL
GLOBULIN SER CALC-MCNC: 2.3 G/DL (ref 1.5–4.5)
GLUCOSE SERPL-MCNC: 103 MG/DL (ref 65–99)
HBA1C MFR BLD: 5.6 % (ref 4.8–5.6)
HDLC SERPL-MCNC: 47 MG/DL
LDLC SERPL CALC-MCNC: 88 MG/DL (ref 0–99)
LITHIUM SERPL-SCNC: 0.5 MMOL/L (ref 0.6–1.2)
POTASSIUM SERPL-SCNC: 4.5 MMOL/L (ref 3.5–5.2)
PROT SERPL-MCNC: 6.9 G/DL (ref 6–8.5)
SODIUM SERPL-SCNC: 144 MMOL/L (ref 134–144)
TRIGL SERPL-MCNC: 119 MG/DL (ref 0–149)
TSH SERPL DL<=0.005 MIU/L-ACNC: 1.58 UIU/ML (ref 0.45–4.5)
VLDLC SERPL CALC-MCNC: 24 MG/DL (ref 5–40)

## 2017-11-17 NOTE — PROGRESS NOTES
A1C continues to show well regulated blood sugars. Metabolic profile revelas normal kidney function and normal liver function. Mild elevation of alkaline phosphatase seen for last 2 years, increased. May represent increased bone turn over. If continues to elevate may consider further investigation, but will hold at this time. Continue to encourage increased activity. Lithium level not above goal range. Vit D at target. Continue daily supplement. Normal thyroid hormone level.

## 2017-12-18 DIAGNOSIS — E55.9 VITAMIN D DEFICIENCY: ICD-10-CM

## 2017-12-20 RX ORDER — MELATONIN
1000 DAILY
Qty: 30 TAB | Refills: 11 | Status: SHIPPED | OUTPATIENT
Start: 2017-12-20 | End: 2018-04-18 | Stop reason: SDUPTHER

## 2018-01-18 DIAGNOSIS — N39.3 STRESS INCONTINENCE: ICD-10-CM

## 2018-01-19 RX ORDER — OXYBUTYNIN CHLORIDE 5 MG/1
5 TABLET ORAL DAILY
Qty: 30 TAB | Refills: 11 | Status: SHIPPED | OUTPATIENT
Start: 2018-01-19 | End: 2018-04-18 | Stop reason: SDUPTHER

## 2018-04-05 NOTE — PROGRESS NOTES
Lab are in target range. A1C stable from prior at 5.6. This reflects good work on following a low-sugar diet. Congratulations. As expected the TSH number is creeping up, while it is still in normal range, I would suggest we trial Gwendolyn on a slight increase in dose from 75 mcg to 88 mcg of levothyroxine as I believe this would keep her in target range and may also help with her attempt at weight loss. Is This A New Presentation, Or A Follow-Up?: Follow Up Skin Lesion How Severe Is Your Skin Lesion?: mild Has Your Skin Lesion Been Treated?: been treated

## 2018-04-18 ENCOUNTER — HOSPITAL ENCOUNTER (OUTPATIENT)
Dept: LAB | Age: 53
Discharge: HOME | End: 2018-04-18
Payer: MEDICARE

## 2018-04-18 ENCOUNTER — OFFICE VISIT (OUTPATIENT)
Dept: INTERNAL MEDICINE CLINIC | Age: 53
End: 2018-04-18

## 2018-04-18 VITALS
DIASTOLIC BLOOD PRESSURE: 83 MMHG | HEART RATE: 100 BPM | TEMPERATURE: 97.7 F | SYSTOLIC BLOOD PRESSURE: 134 MMHG | BODY MASS INDEX: 42.63 KG/M2 | WEIGHT: 225.8 LBS | HEIGHT: 61 IN | RESPIRATION RATE: 19 BRPM | OXYGEN SATURATION: 91 %

## 2018-04-18 DIAGNOSIS — E55.9 VITAMIN D DEFICIENCY: ICD-10-CM

## 2018-04-18 DIAGNOSIS — K59.00 CONSTIPATION, UNSPECIFIED CONSTIPATION TYPE: ICD-10-CM

## 2018-04-18 DIAGNOSIS — F31.9 BIPOLAR 1 DISORDER (HCC): ICD-10-CM

## 2018-04-18 DIAGNOSIS — R73.01 ELEVATED FASTING GLUCOSE: ICD-10-CM

## 2018-04-18 DIAGNOSIS — E03.9 HYPOTHYROIDISM, UNSPECIFIED TYPE: ICD-10-CM

## 2018-04-18 DIAGNOSIS — M16.9 OSTEOARTHRITIS OF HIP, UNSPECIFIED LATERALITY, UNSPECIFIED OSTEOARTHRITIS TYPE: ICD-10-CM

## 2018-04-18 DIAGNOSIS — E66.01 MORBID OBESITY (HCC): Primary | ICD-10-CM

## 2018-04-18 DIAGNOSIS — N39.3 STRESS INCONTINENCE: ICD-10-CM

## 2018-04-18 PROCEDURE — 83036 HEMOGLOBIN GLYCOSYLATED A1C: CPT

## 2018-04-18 PROCEDURE — 84443 ASSAY THYROID STIM HORMONE: CPT

## 2018-04-18 PROCEDURE — 80178 ASSAY OF LITHIUM: CPT

## 2018-04-18 PROCEDURE — 80053 COMPREHEN METABOLIC PANEL: CPT

## 2018-04-18 RX ORDER — POLYETHYLENE GLYCOL 3350 17 G/17G
17 POWDER, FOR SOLUTION ORAL 2 TIMES DAILY
Qty: 850 G | Refills: 11 | Status: SHIPPED | OUTPATIENT
Start: 2018-04-18 | End: 2019-04-24 | Stop reason: CLARIF

## 2018-04-18 RX ORDER — OXYBUTYNIN CHLORIDE 5 MG/1
5 TABLET ORAL DAILY
Qty: 30 TAB | Refills: 11 | Status: SHIPPED | OUTPATIENT
Start: 2018-04-18 | End: 2019-04-24 | Stop reason: SDUPTHER

## 2018-04-18 RX ORDER — LEVOTHYROXINE SODIUM 88 UG/1
88 TABLET ORAL
Qty: 30 TAB | Refills: 11 | Status: SHIPPED | OUTPATIENT
Start: 2018-04-18 | End: 2019-04-24 | Stop reason: SDUPTHER

## 2018-04-18 RX ORDER — MELATONIN
1000 DAILY
Qty: 30 TAB | Refills: 11 | Status: SHIPPED | OUTPATIENT
Start: 2018-04-18 | End: 2019-04-24 | Stop reason: SDUPTHER

## 2018-04-18 RX ORDER — DOCUSATE SODIUM 100 MG/1
100 CAPSULE, LIQUID FILLED ORAL 2 TIMES DAILY
Qty: 60 CAP | Refills: 11 | Status: SHIPPED | OUTPATIENT
Start: 2018-04-18 | End: 2019-04-24 | Stop reason: SDUPTHER

## 2018-04-18 NOTE — PROGRESS NOTES
VIVI Ford is a 52 y.o. female, she presents today for:    Ongoing weight gain. Has changed day program.   Water aerobis on wednes  And walking. PMH/PSH: reviewed and updated  Sochx:  reports that she has never smoked. She has never used smokeless tobacco. She reports that she does not drink alcohol or use illicit drugs. Famhx: reviewed and updated     All: No Known Allergies  Med:   Current Outpatient Prescriptions   Medication Sig    oxybutynin (DITROPAN) 5 mg tablet Take 1 Tab by mouth daily.  cholecalciferol (VITAMIN D3) 1,000 unit tablet Take 1 Tab by mouth daily.  docusate sodium (COLACE) 100 mg capsule Take 1 Cap by mouth two (2) times a day.  polyethylene glycol (MIRALAX) 17 gram/dose powder Take 17 g by mouth two (2) times a day. Hold for lose stool    levothyroxine (SYNTHROID) 88 mcg tablet Take 1 Tab by mouth Daily (before breakfast).  citalopram (CELEXA) 20 mg tablet Take 1 Tab by mouth daily.  lamoTRIgine (LAMICTAL) 150 mg tablet Take 150 mg by mouth nightly.  lithium carbonate (LITHOBID) 600 mg capsule Take 600 mg by mouth nightly.  cloZAPine (CLOZARIL) 100 mg tablet Take 4 Tabs by mouth nightly. No current facility-administered medications for this visit. Review of Systems   Constitutional: Negative for chills, fever and malaise/fatigue. Respiratory: Negative for shortness of breath. Cardiovascular: Negative for chest pain. PE:  Blood pressure 134/83, pulse 100, temperature 97.7 °F (36.5 °C), temperature source Oral, resp. rate 19, height 5' 1\" (1.549 m), weight 225 lb 12.8 oz (102.4 kg), last menstrual period 06/24/2013, SpO2 91 %. Body mass index is 42.66 kg/(m^2). Physical Exam   Constitutional: She is oriented to person, place, and time. She appears well-developed and well-nourished. No distress. HENT:   Head: Normocephalic. Mouth/Throat: Oropharynx is clear and moist.   Eyes: Conjunctivae and EOM are normal.   Neck: Neck supple. Cardiovascular: Normal rate, regular rhythm and normal heart sounds. Pulmonary/Chest: Effort normal and breath sounds normal.   Neurological: She is alert and oriented to person, place, and time. Skin: Skin is warm and dry. Nursing note and vitals reviewed. Labs:   No results found for any visits on 04/18/18. A/P:  52 y.o. female    ICD-10-CM ICD-9-CM    1. Morbid obesity ([facility] [address] P95.56 702.16 METABOLIC PANEL, COMPREHENSIVE   2. Bipolar 1 disorder (HCC) F31.9 296.7 LITHIUM      METABOLIC PANEL, COMPREHENSIVE   3. Osteoarthritis of hip, unspecified laterality, unspecified osteoarthritis type M16.9 715.95    4. Elevated fasting glucose R73.01 790.21 HEMOGLOBIN A1C WITH EAG   5. Vitamin D deficiency E55.9 268.9 cholecalciferol (VITAMIN D3) 1,000 unit tablet   6. Constipation, unspecified constipation type K59.00 564.00 docusate sodium (COLACE) 100 mg capsule      polyethylene glycol (MIRALAX) 17 gram/dose powder   7. Stress incontinence N39.3 [id number] oxybutynin (DITROPAN) 5 mg tablet   8. Hypothyroidism, unspecified type E03.9 244.9 levothyroxine (SYNTHROID) 88 mcg tablet      TSH 3RD GENERATION     Hypothyroid: on stable dose of levothyroxine. TSH requested for monitoring.      Elevated A1C:  Mild, not on metformin. Continue to focus on avoiding excess sugar, exercise and weight control. Follow-up a1c requested.      Morbid obesity: This is a long term struggle for Enrrique. While she is aware that she needs to eat less, it is difficult in her setting and situation.      Vit D deficiency: repeat level to ensure at goal.     Constipation: continue colase and prn miralax. Stress incontinence: responding well to xoybutinin at this time, no increased dizziness, falls, nor confusion     Bipolar type 1 disorder: stable and followed with psychiatrist.     Left knee osteoarthritis: improved movement and pain after left hip replacement. Continue to follow-up with Dr. Ricarda Andrade as needed.     - She was given AVS and expressed understanding with the diagnosis and plan as discussed. Follow-up Disposition:  Return in about 6 months (around 11/1/2018) for medicare wellness and follow-up.   Future Appointments  Date Time Provider Mery Brown   11/21/2018 8:30 AM Obinna Narvaez MD [address] [facility]

## 2018-04-18 NOTE — PATIENT INSTRUCTIONS
Please consider getting half servicing of all foods except vegetables at dinner. Please consider skipping evening fruit. Please consider not keeping any candy in your room. If you are hungry in the evenings be sure to drink a glass of water or decaf/herbal tea before getting a snack. It may just be you are thirsty. Losing 10 pounds would really help reduce your blood pressure risk, pain on your knees when walking and future risk of diabetes. See attached sheet on shingrix we did not discuss.

## 2018-04-18 NOTE — PROGRESS NOTES
Exam Room #2  Ralph Patten is a 52 y.o. female  Chief Complaint   Patient presents with    Hypothyroidism     Follow up    Weight Management     Follow up     1. Have you been to the ER, urgent care clinic since your last visit? Hospitalized since your last visit? No    2. Have you seen or consulted any other health care providers outside of the [address] [place] [address] since your last visit? Include any pap smears or colon screening.  No    Visit Vitals    /83 (BP 1 Location: Right arm, BP Patient Position: Sitting)    Pulse 100    Temp 97.7 °F (36.5 °C) (Oral)    Resp 19    Ht 5' 1\" (1.549 m)    Wt 225 lb 12.8 oz (102.4 kg)    SpO2 91%    BMI 42.66 kg/m2

## 2018-04-18 NOTE — LETTER
4/18/2018 9:26 AM 
 
Ms. Velasquez Cole 
[address] [place] Pending sale to [facility] [phone] To Whom It May Concern: 
 
Velasquez Cole is currently under the care of Emmanuel. Please help her adhere to the following: 
 
Please consider getting half servicing of all foods except vegetables at dinner. Please consider skipping evening fruit. Please consider not keeping any candy in your room. If you are hungry in the evenings be sure to drink a glass of water or decaf/herbal tea before getting a snack. It may just be you are thirsty. Losing 10 pounds would really help reduce your blood pressure risk, pain on your knees when walking and future risk of diabetes.   
 
 
 
Sincerely, 
 
 
Sana Grace MD

## 2018-04-18 NOTE — MR AVS SNAPSHOT
[address] [facility] [address] [facility] [address] 
[phone] Patient: Norman Pratt MRN: [id number] BPX:4/57/2864 Visit Information Date & Time Provider Department Dept. Phone Encounter #  
 4/18/2018  8:30 AM Augustine Jasmine MD [address] [facility] [phone] Follow-up Instructions Return in about 6 months (around 11/1/2018) for medicare wellness and follow-up. Upcoming Health Maintenance Date Due  
 PAP AKA CERVICAL CYTOLOGY 10/20/2017 MEDICARE YEARLY EXAM 11/16/2018 BREAST CANCER SCRN MAMMOGRAM 12/19/2018 COLONOSCOPY 4/7/2019 DTaP/Tdap/Td series (2 - Td) 4/20/2025 Allergies as of 4/18/2018  Review Complete On: 4/18/2018 By: Janiya Soto LPN No Known Allergies Current Immunizations  Reviewed on 11/15/2017 Name Date Influenza Vaccine 11/3/2014 Influenza Vaccine (Quad) PF 10/3/2016, 10/21/2015, 10/16/2013 PPD 10/14/2009 TB Skin Test (PPD) Intradermal 10/3/2016, 10/21/2015, 10/20/2014, 10/16/2013  9:30 AM  
 Tdap 4/20/2015 Not reviewed this visit You Were Diagnosed With   
  
 Codes Comments Morbid obesity ([facility] [address]    -  Primary ICD-10-CM: E66.01 
ICD-9-CM: 278.01 Bipolar 1 disorder (HCC)     ICD-10-CM: F31.9 ICD-9-CM: 296.7 Osteoarthritis of hip, unspecified laterality, unspecified osteoarthritis type     ICD-10-CM: M16.9 ICD-9-CM: 715.95 Elevated fasting glucose     ICD-10-CM: R73.01 
ICD-9-CM: 790.21 Vitamin D deficiency     ICD-10-CM: E55.9 ICD-9-CM: 268.9 Constipation, unspecified constipation type     ICD-10-CM: K59.00 ICD-9-CM: 564.00 Stress incontinence     ICD-10-CM: N39.3 ICD-9-CM: [id number] Hypothyroidism, unspecified type     ICD-10-CM: E03.9 ICD-9-CM: 770. 9 Vitals BP Pulse Temp Resp Height(growth percentile) Weight(growth percentile) 134/83 (BP 1 Location: Right arm, BP Patient Position: Sitting) 100 97.7 °F (36.5 °C) (Oral) 19 5' 1\" (1.549 m) 225 lb 12.8 oz (102.4 kg) LMP SpO2 BMI OB Status Smoking Status 06/24/2013 91% 42.66 kg/m2 Postmenopausal Never Smoker BMI and BSA Data Body Mass Index Body Surface Area  
 42.66 kg/m 2 2.1 m 2 Preferred Pharmacy Pharmacy Name Phone [address] [facility] [phone] Your Updated Medication List  
  
   
This list is accurate as of 4/18/18  9:27 AM.  Always use your most recent med list.  
  
  
  
  
 cholecalciferol 1,000 unit tablet Commonly known as:  VITAMIN D3 Take 1 Tab by mouth daily. citalopram 20 mg tablet Commonly known as:  Jerl Bulla Take 1 Tab by mouth daily. cloZAPine 100 mg tablet Commonly known as:  CLOZARIL Take 4 Tabs by mouth nightly. docusate sodium 100 mg capsule Commonly known as:  Hermina  Take 1 Cap by mouth two (2) times a day. LaMICtal 150 mg tablet Generic drug:  lamoTRIgine Take 150 mg by mouth nightly. levothyroxine 88 mcg tablet Commonly known as:  SYNTHROID Take 1 Tab by mouth Daily (before breakfast). lithium carbonate 600 mg capsule Take 600 mg by mouth nightly. oxybutynin 5 mg tablet Commonly known as:  EIFVWVYM Take 1 Tab by mouth daily. polyethylene glycol 17 gram/dose powder Commonly known as:  Marcellina Dull Take 17 g by mouth two (2) times a day. Hold for lose stool Prescriptions Sent to Pharmacy Refills  
 cholecalciferol (VITAMIN D3) 1,000 unit tablet 11 Sig: Take 1 Tab by mouth daily. Class: Normal  
 [place] [address], [facility] Ph #: [phone] Route: Oral  
 docusate sodium (COLACE) 100 mg capsule 11 Sig: Take 1 Cap by mouth two (2) times a day.   
 Class: Normal  
 [facility] Ph #: [phone] Route: Oral  
 levothyroxine (SYNTHROID) 88 mcg tablet 11 Sig: Take 1 Tab by mouth Daily (before breakfast). Class: Normal  
 Pharmacy: [address], [facility] Ph #: [phone] Route: Oral  
 oxybutynin (DITROPAN) 5 mg tablet 11 Sig: Take 1 Tab by mouth daily. Class: Normal  
 Pharm[place] [address], [facility] Ph #: [phone] Route: Oral  
 polyethylene glycol (MIRALAX) 17 gram/dose powder 11 Sig: Take 17 g by mouth two (2) times a day. Hold for lose stool Class: Normal  
 Pharmacy: [address] [facility] Ph #: [phone] Route: Oral  
  
We Performed the Following HEMOGLOBIN A1C WITH EAG [94177 CPT(R)] LITHIUM [id number] CPT(R)] METABOLIC PANEL, COMPREHENSIVE [24205 CPT(R)] TSH 3RD GENERATION [43623 CPT(R)] Follow-up Instructions Return in about 6 months (around 11/1/2018) for medicare wellness and follow-up. Patient Instructions Please consider getting half servicing of all foods except vegetables at dinner. Please consider skipping evening fruit. Please consider not keeping any candy in your room. If you are hungry in the evenings be sure to drink a glass of water or decaf/herbal tea before getting a snack. It may just be you are thirsty. Losing 10 pounds would really help reduce your blood pressure risk, pain on your knees when walking and future risk of diabetes. See attached sheet on shingrix we did not discuss. Introducing [facility]! Dear [place]: 
Thank you for requesting a Regalister account. Our records indicate that you already have an active Regalister account. You can access your account anytime at [email or web address] Did you know that you can access your hospital and ER discharge instructions at any time in Bitnami? You can also review all of your test results from your hospital stay or ER visit. Additional Information If you have questions, please visit the Frequently Asked Questions section of the Bitnami website at [email or web address] Remember, Bitnami is NOT to be used for urgent needs. For medical emergencies, dial 911. Now available from your iPhone and Android! Please provide this summary of care documentation to your next provider. Your primary care clinician is listed as Fede Louie. If you have any questions after today's visit, please call [phone].

## 2018-04-19 LAB
ALBUMIN SERPL-MCNC: 4.5 G/DL (ref 3.5–5.5)
ALBUMIN/GLOB SERPL: 1.7 {RATIO} (ref 1.2–2.2)
ALP SERPL-CCNC: 129 IU/L (ref 39–117)
ALT SERPL-CCNC: 19 IU/L (ref 0–32)
AST SERPL-CCNC: 18 IU/L (ref 0–40)
BILIRUB SERPL-MCNC: 0.3 MG/DL (ref 0–1.2)
BUN SERPL-MCNC: 10 MG/DL (ref 6–24)
BUN/CREAT SERPL: 12 (ref 9–23)
CALCIUM SERPL-MCNC: 9.9 MG/DL (ref 8.7–10.2)
CHLORIDE SERPL-SCNC: 104 MMOL/L (ref 96–106)
CO2 SERPL-SCNC: 28 MMOL/L (ref 18–29)
CREAT SERPL-MCNC: 0.83 MG/DL (ref 0.57–1)
EST. AVERAGE GLUCOSE BLD GHB EST-MCNC: 111 MG/DL
GLOBULIN SER CALC-MCNC: 2.6 G/DL (ref 1.5–4.5)
GLUCOSE SERPL-MCNC: 105 MG/DL (ref 65–99)
HBA1C MFR BLD: 5.5 % (ref 4.8–5.6)
LITHIUM SERPL-SCNC: 0.5 MMOL/L (ref 0.6–1.2)
POTASSIUM SERPL-SCNC: 4.4 MMOL/L (ref 3.5–5.2)
PROT SERPL-MCNC: 7.1 G/DL (ref 6–8.5)
SODIUM SERPL-SCNC: 144 MMOL/L (ref 134–144)
TSH SERPL DL<=0.005 MIU/L-ACNC: 2.09 UIU/ML (ref 0.45–4.5)

## 2018-04-19 NOTE — PROGRESS NOTES
Lab for monitoring of lithium,  thyroid medication and risk of metabolic disease. All appear to be in target range, lithium is not above therapeutic safety range. A1C indicates average blood sugar in ideal range at this time.

## 2018-07-12 ENCOUNTER — DOCUMENTATION ONLY (OUTPATIENT)
Dept: INTERNAL MEDICINE CLINIC | Age: 53
End: 2018-07-12

## 2018-07-12 NOTE — PROGRESS NOTES
Last office visit , lab letter and demographics , along with consent form to disclose information faxed to [address] [place] [address] - F# - [phone]

## 2018-08-14 PROBLEM — N60.02 CYST OF LEFT BREAST: Status: ACTIVE | Noted: 2018-08-14

## 2018-11-01 ENCOUNTER — HOSPITAL ENCOUNTER (OUTPATIENT)
Dept: LAB | Age: 53
Discharge: HOME | End: 2018-11-01
Payer: MEDICARE

## 2018-11-01 ENCOUNTER — OFFICE VISIT (OUTPATIENT)
Dept: INTERNAL MEDICINE CLINIC | Age: 53
End: 2018-11-01

## 2018-11-01 VITALS
BODY MASS INDEX: 42.29 KG/M2 | DIASTOLIC BLOOD PRESSURE: 85 MMHG | HEIGHT: 61 IN | WEIGHT: 224 LBS | TEMPERATURE: 98.5 F | SYSTOLIC BLOOD PRESSURE: 123 MMHG | OXYGEN SATURATION: 97 % | HEART RATE: 94 BPM | RESPIRATION RATE: 18 BRPM

## 2018-11-01 DIAGNOSIS — R35.0 URINARY FREQUENCY: Primary | ICD-10-CM

## 2018-11-01 DIAGNOSIS — R35.1 NOCTURIA: ICD-10-CM

## 2018-11-01 LAB
BILIRUB UR QL STRIP: NEGATIVE
GLUCOSE UR-MCNC: NEGATIVE MG/DL
KETONES P FAST UR STRIP-MCNC: NEGATIVE MG/DL
PH UR STRIP: 5.5 [PH] (ref 4.6–8)
PROT UR QL STRIP: NEGATIVE
SP GR UR STRIP: 1.02 (ref 1–1.03)
UA UROBILINOGEN AMB POC: NORMAL (ref 0.2–1)
URINALYSIS CLARITY POC: NORMAL
URINALYSIS COLOR POC: YELLOW
URINE BLOOD POC: NEGATIVE
URINE LEUKOCYTES POC: NORMAL
URINE NITRITES POC: NEGATIVE

## 2018-11-01 PROCEDURE — 87086 URINE CULTURE/COLONY COUNT: CPT

## 2018-11-01 PROCEDURE — 87077 CULTURE AEROBIC IDENTIFY: CPT

## 2018-11-01 PROCEDURE — 87088 URINE BACTERIA CULTURE: CPT

## 2018-11-01 NOTE — PROGRESS NOTES
ACUTE VISIT     HPI:   [place] is a 52 y.o. female, she presents today for:     Well woman appointment scheduled for 3 weeks from today. However today presents with increased urination at night. Long standing continence issues. 2  Nights ago had gotten 8 times. Sometimes finds it Is hard to sleep. Will wake up and watch television. Ongoing trouble with bladder leading. ROS: as noted above. No fever no chills, no N/V/D. No rash. Medications used for acute illness: none    Current Outpatient Medications on File Prior to Visit   Medication Sig    cholecalciferol (VITAMIN D3) 1,000 unit tablet Take 1 Tab by mouth daily.  docusate sodium (COLACE) 100 mg capsule Take 1 Cap by mouth two (2) times a day.  levothyroxine (SYNTHROID) 88 mcg tablet Take 1 Tab by mouth Daily (before breakfast).  oxybutynin (DITROPAN) 5 mg tablet Take 1 Tab by mouth daily.  polyethylene glycol (MIRALAX) 17 gram/dose powder Take 17 g by mouth two (2) times a day. Hold for lose stool    cloZAPine (CLOZARIL) 100 mg tablet Take 4 Tabs by mouth nightly.  lamoTRIgine (LAMICTAL) 150 mg tablet Take 150 mg by mouth nightly.  lithium carbonate (LITHOBID) 600 mg capsule Take 600 mg by mouth nightly.  citalopram (CELEXA) 20 mg tablet Take 1 Tab by mouth daily. (Patient not taking: Reported on 11/1/2018)     No current facility-administered medications on file prior to visit. No Known Allergies    PMH/PSH/FH: reviewed and updated    Sochx:   reports that  has never smoked. she has never used smokeless tobacco. She reports that she does not drink alcohol or use drugs. PE:  Blood pressure 123/85, pulse 94, temperature 98.5 °F (36.9 °C), temperature source Oral, resp. rate 18, height 5' 1\" (1.549 m), weight 224 lb (101.6 kg), last menstrual period 06/24/2013, SpO2 97 %. Body mass index is 42.32 kg/m². Physical Exam   Constitutional: She is oriented to person, place, and time.  She appears well-developed and well-nourished. No distress. HENT:   Head: Normocephalic. Mouth/Throat: Oropharynx is clear and moist.   Eyes: Conjunctivae and EOM are normal.   Neck: Neck supple. Cardiovascular: Normal rate, regular rhythm and normal heart sounds. Pulmonary/Chest: Effort normal and breath sounds normal.   Neurological: She is alert and oriented to person, place, and time. Skin: Skin is warm and dry. Nursing note and vitals reviewed. Labs:  Results for orders placed or performed in visit on 11/01/18   AMB POC URINALYSIS DIP STICK AUTO W/O MICRO   Result Value Ref Range    Color (UA POC) Yellow     Clarity (UA POC) Slightly Cloudy     Glucose (UA POC) Negative Negative    Bilirubin (UA POC) Negative Negative    Ketones (UA POC) Negative Negative    Specific gravity (UA POC) 1.025 1.001 - 1.035    Blood (UA POC) Negative Negative    pH (UA POC) 5.5 4.6 - 8.0    Protein (UA POC) Negative Negative    Urobilinogen (UA POC) 0.2 mg/dL 0.2 - 1    Nitrites (UA POC) Negative Negative    Leukocyte esterase (UA POC) 1+ Negative     A/P  [place] D. Camelia Martinez was seen today for had concerns including Nocturia (Per mom has been up throughout the night urinating per caregiver at group home). .  The diagnosis and plan was discussed including:        ICD-10-CM ICD-9-CM    1. Urinary frequency R35.0 788.41 AMB POC URINALYSIS DIP STICK AUTO W/O MICRO      CULTURE, URINE   2. Nocturia R35.1 788.43 CULTURE, URINE     Urinary frequency, a little worse at night. Patient with long standing bladder continence issues. Does not have significant change in urinary frequency from baseline, does not have pain. At this time, recommend holding on new anabiotic. To call if new pain in abdomen, pain with urination.      - I advised her to call back or return to office if symptoms worsen/change/persist.  - She was given AVS and expressed understanding with the diagnosis and plan as discussed.     Follow-up Disposition:  Return if symptoms worsen or fail to improve.

## 2018-11-01 NOTE — PATIENT INSTRUCTIONS
Low Sodium Diet (2,000 Milligram): Care Instructions  Your Care Instructions    Too much sodium causes your body to hold on to extra water. This can raise your blood pressure and force your heart and kidneys to work harder. In very serious cases, this could cause you to be put in the hospital. It might even be life-threatening. By limiting sodium, you will feel better and lower your risk of serious problems. The most common source of sodium is salt. People get most of the salt in their diet from canned, prepared, and packaged foods. Fast food and restaurant meals also are very high in sodium. Your doctor will probably limit your sodium to less than 2,000 milligrams (mg) a day. This limit counts all the sodium in prepared and packaged foods and any salt you add to your food. Follow-up care is a key part of your treatment and safety. Be sure to make and go to all appointments, and call your doctor if you are having problems. It's also a good idea to know your test results and keep a list of the medicines you take. How can you care for yourself at home? Read food labels  · Read labels on cans and food packages. The labels tell you how much sodium is in each serving. Make sure that you look at the serving size. If you eat more than the serving size, you have eaten more sodium. · Food labels also tell you the Percent Daily Value for sodium. Choose products with low Percent Daily Values for sodium. · Be aware that sodium can come in forms other than salt, including monosodium glutamate (MSG), sodium citrate, and sodium bicarbonate (baking soda). MSG is often added to Asian food. When you eat out, you can sometimes ask for food without MSG or added salt. Buy low-sodium foods  · Buy foods that are labeled \"unsalted\" (no salt added), \"sodium-free\" (less than 5 mg of sodium per serving), or \"low-sodium\" (less than 140 mg of sodium per serving).  Foods labeled \"reduced-sodium\" and \"light sodium\" may still have too much sodium. Be sure to read the label to see how much sodium you are getting. · Buy fresh vegetables, or frozen vegetables without added sauces. Buy low-sodium versions of canned vegetables, soups, and other canned goods. Prepare low-sodium meals  · Cut back on the amount of salt you use in cooking. This will help you adjust to the taste. Do not add salt after cooking. One teaspoon of salt has about 2,300 mg of sodium. · Take the salt shaker off the table. · Flavor your food with garlic, lemon juice, onion, vinegar, herbs, and spices. Do not use soy sauce, lite soy sauce, steak sauce, onion salt, garlic salt, celery salt, mustard, or ketchup on your food. · Use low-sodium salad dressings, sauces, and ketchup. Or make your own salad dressings and sauces without adding salt. · Use less salt (or none) when recipes call for it. You can often use half the salt a recipe calls for without losing flavor. Other foods such as rice, pasta, and grains do not need added salt. · Rinse canned vegetables, and cook them in fresh water. This removes some--but not all--of the salt. · Avoid water that is naturally high in sodium or that has been treated with water softeners, which add sodium. Call your local water company to find out the sodium content of your water supply. If you buy bottled water, read the label and choose a sodium-free brand. Avoid high-sodium foods  · Avoid eating:  ? Smoked, cured, salted, and canned meat, fish, and poultry. ? Ham, wayne, hot dogs, and luncheon meats. ? Regular, hard, and processed cheese and regular peanut butter. ? Crackers with salted tops, and other salted snack foods such as pretzels, chips, and salted popcorn. ? Frozen prepared meals, unless labeled low-sodium. ? Canned and dried soups, broths, and bouillon, unless labeled sodium-free or low-sodium. ? Canned vegetables, unless labeled sodium-free or low-sodium. ? Western Preeti fries, pizza, tacos, and other fast foods.   ? Ladean Mow, olives, ketchup, and other condiments, especially soy sauce, unless labeled sodium-free or low-sodium. Where can you learn more? Go to http://juan-maryellen.info/. Enter N764 in the search box to learn more about \"Low Sodium Diet (2,000 Milligram): Care Instructions. \"  Current as of: March 29, 2018  Content Version: 11.8  © 7029-8671 [employer]Q. Care instructions adapted under license by [employer] (which disclaims liability or warranty for this information). If you have questions about a medical condition or this instruction, always ask your healthcare professional. [address] any warranty or liability for your use of this information.

## 2018-11-01 NOTE — PROGRESS NOTES
Exam room Rolando is a 52 y.o. female  Patient is accompanied with mom at this visit    Chief Complaint   Patient presents with    Nocturia     Per mom has been up throughout the night urinating per caregiver at group home     1. Have you been to the ER, urgent care clinic since your last visit? Hospitalized since your last visit? No    2. Have you seen or consulted any other health care providers outside of the [address] [place] [address] since your last visit? Include any pap smears or colon screening.  No     Health Maintenance Due   Topic Date Due    Shingrix Vaccine Age 49> (1 of 2) 01/18/2013    PAP AKA CERVICAL CYTOLOGY  10/20/2017    COLONOSCOPY  04/07/2019     Colonoscopy: Per mom appt scheduled soon    Pap Smear: 8/2018, HOSP [facility]

## 2018-11-03 LAB
BACTERIA UR CULT: ABNORMAL
BACTERIA UR CULT: ABNORMAL

## 2018-11-05 ENCOUNTER — TELEPHONE (OUTPATIENT)
Dept: INTERNAL MEDICINE CLINIC | Age: 53
End: 2018-11-05

## 2018-11-05 DIAGNOSIS — N30.00 ACUTE CYSTITIS WITHOUT HEMATURIA: Primary | ICD-10-CM

## 2018-11-05 RX ORDER — AMOXICILLIN 500 MG/1
500 CAPSULE ORAL 3 TIMES DAILY
Qty: 9 CAP | Refills: 0 | Status: SHIPPED | OUTPATIENT
Start: 2018-11-05 | End: 2018-11-08

## 2018-11-05 NOTE — PROGRESS NOTES
Possible infectious source, verses local skin raphael. Plan to treat to see if reduction in urinary frequency. See phone note.

## 2018-11-05 NOTE — TELEPHONE ENCOUNTER
Patient with moderate growth of group B strep. This bacteria can be naturally found in vaginal area, so it is hard to say if it is causing a bladder infection for sure. But with increased urination recently, I could suggest we see if [place] responds to a short course of antibiotics and shows improvement. Please. Call and advise. Rx for amoxicillin 500mg TID for 3  days written.     Thanks  Raquel Christensen MD

## 2018-11-05 NOTE — TELEPHONE ENCOUNTER
Received: Today   Message Contents   Crystal [phone]   Pt is returning the nurse's call received this morning (reason not provided).

## 2018-11-06 NOTE — TELEPHONE ENCOUNTER
----- Message from [employer] sent at 11/6/2018  9:26 AM EST -----  Regarding: Dr. Radha Perez [phone]    Pt is returning missed call received yesterday (reason for the call was not provided to pt). Pt is f/up to her message left yesterday.

## 2018-11-07 NOTE — TELEPHONE ENCOUNTER
Spoke with pt , after verifying pt . Let pt know results and rx sent to pharmacy. Pt stated that she had picked up rx and is feeling better. Pt stated she is no longer having any problems . Answered any and all questions pt had. Pt voiced understanding.

## 2018-11-21 ENCOUNTER — HOSPITAL ENCOUNTER (OUTPATIENT)
Dept: LAB | Age: 53
Discharge: HOME | End: 2018-11-21
Payer: MEDICARE

## 2018-11-21 ENCOUNTER — OFFICE VISIT (OUTPATIENT)
Dept: INTERNAL MEDICINE CLINIC | Age: 53
End: 2018-11-21

## 2018-11-21 VITALS
DIASTOLIC BLOOD PRESSURE: 75 MMHG | HEIGHT: 61 IN | TEMPERATURE: 97.9 F | WEIGHT: 224.4 LBS | HEART RATE: 99 BPM | BODY MASS INDEX: 42.37 KG/M2 | OXYGEN SATURATION: 94 % | RESPIRATION RATE: 16 BRPM | SYSTOLIC BLOOD PRESSURE: 128 MMHG

## 2018-11-21 DIAGNOSIS — Z80.0 FAMILY HISTORY OF COLON CANCER: ICD-10-CM

## 2018-11-21 DIAGNOSIS — E03.9 HYPOTHYROIDISM, UNSPECIFIED TYPE: ICD-10-CM

## 2018-11-21 DIAGNOSIS — Z11.1 SCREENING FOR TUBERCULOSIS: ICD-10-CM

## 2018-11-21 DIAGNOSIS — Z87.820 HISTORY OF TRAUMATIC BRAIN INJURY: ICD-10-CM

## 2018-11-21 DIAGNOSIS — R73.01 ELEVATED FASTING GLUCOSE: ICD-10-CM

## 2018-11-21 DIAGNOSIS — F31.9 BIPOLAR 1 DISORDER (HCC): ICD-10-CM

## 2018-11-21 DIAGNOSIS — Z00.00 MEDICARE ANNUAL WELLNESS VISIT, SUBSEQUENT: Primary | ICD-10-CM

## 2018-11-21 DIAGNOSIS — M17.32 POST-TRAUMATIC OSTEOARTHRITIS OF LEFT KNEE: ICD-10-CM

## 2018-11-21 PROCEDURE — 83036 HEMOGLOBIN GLYCOSYLATED A1C: CPT

## 2018-11-21 PROCEDURE — 80178 ASSAY OF LITHIUM: CPT

## 2018-11-21 PROCEDURE — 80048 BASIC METABOLIC PNL TOTAL CA: CPT

## 2018-11-21 PROCEDURE — 84443 ASSAY THYROID STIM HORMONE: CPT

## 2018-11-21 NOTE — PATIENT INSTRUCTIONS
1) please schedule colonoscopy Medicare Wellness Visit, Female The best way to live healthy is to have a lifestyle where you eat a well-balanced diet, exercise regularly, limit alcohol use, and quit all forms of tobacco/nicotine, if applicable. Regular preventive services are another way to keep healthy. Preventive services (vaccines, screening tests, monitoring & exams) can help personalize your care plan, which helps you manage your own care. Screening tests can find health problems at the earliest stages, when they are easiest to treat. [facility] follows the current, evidence-based guidelines published by the [facility] Neftali Abram (USPSTF) when recommending preventive services for our patients. Because we follow these guidelines, sometimes recommendations change over time as research supports it. (For example, mammograms used to be recommended annually. Even though Medicare will still pay for an annual mammogram, the newer guidelines recommend a mammogram every two years for women of average risk.) Of course, you and your doctor may decide to screen more often for some diseases, based on your risk and your health status. Preventive services for you include: - Medicare offers their members a free annual wellness visit, which is time for you and your primary care provider to discuss and plan for your preventive service needs. Take advantage of this benefit every year! 
-All adults over the age of 72 should receive the recommended pneumonia vaccines. Current USPSTF guidelines recommend a series of two vaccines for the best pneumonia protection.  
-All adults should have a flu vaccine yearly and a tetanus vaccine every 10 years. All adults age 61 and older should receive a shingles vaccine once in their lifetime.   
-A bone mass density test is recommended when a woman turns 65 to screen for osteoporosis. This test is only recommended one time, as a screening. Some providers will use this same test as a disease monitoring tool if you already have osteoporosis. -All adults age 38-68 who are overweight should have a diabetes screening test once every three years.  
-Other screening tests and preventive services for persons with diabetes include: an eye exam to screen for diabetic retinopathy, a kidney function test, a foot exam, and stricter control over your cholesterol.  
-Cardiovascular screening for adults with routine risk involves an electrocardiogram (ECG) at intervals determined by your doctor.  
-Colorectal cancer screenings should be done for adults age 54-65 with no increased risk factors for colorectal cancer. There are a number of acceptable methods of screening for this type of cancer. Each test has its own benefits and drawbacks. Discuss with your doctor what is most appropriate for you during your annual wellness visit. The different tests include: colonoscopy (considered the best screening method), a fecal occult blood test, a fecal DNA test, and sigmoidoscopy. -Breast cancer screenings are recommended every other year for women of normal risk, age 54-69. 
-Cervical cancer screenings for women over age 72 are only recommended with certain risk factors.  
-All adults born between [facility] should be screened once for Hepatitis C. Here is a list of your current Health Maintenance items (your personalized list of preventive services) with a due date: 
Health Maintenance Due Topic Date Due  Shingles Vaccine (1 of 2) 01/18/2013  Cervical Cancer Screening  10/20/2017 [address] [place] [address] Annual Well Visit  11/16/2018  Colonoscopy  04/07/2019 Well Visit, Women 48 to 72: Care Instructions Your Care Instructions Physical exams can help you stay healthy. Your doctor has checked your overall health and may have suggested ways to take good care of yourself. He or she also may have recommended tests.  At home, you can help prevent illness with healthy eating, regular exercise, and other steps. Follow-up care is a key part of your treatment and safety. Be sure to make and go to all appointments, and call your doctor if you are having problems. It's also a good idea to know your test results and keep a list of the medicines you take. How can you care for yourself at home? · Reach and stay at a healthy weight. This will lower your risk for many problems, such as obesity, diabetes, heart disease, and high blood pressure. · Get at least 30 minutes of exercise on most days of the week. Walking is a good choice. You also may want to do other activities, such as running, swimming, cycling, or playing tennis or team sports. · Do not smoke. Smoking can make health problems worse. If you need help quitting, talk to your doctor about stop-smoking programs and medicines. These can increase your chances of quitting for good. · Protect your skin from too much sun. When you're outdoors from 10 a.m. to 4 p.m., stay in the shade or cover up with clothing and a hat with a wide brim. Wear sunglasses that block UV rays. Even when it's cloudy, put broad-spectrum sunscreen (SPF 30 or higher) on any exposed skin. · See a dentist one or two times a year for checkups and to have your teeth cleaned. · Wear a seat belt in the car. · Limit alcohol to 1 drink a day. Too much alcohol can cause health problems. Follow your doctor's advice about when to have certain tests. These tests can spot problems early. · Cholesterol. Your doctor will tell you how often to have this done based on your age, family history, or other things that can increase your risk for heart attack and stroke. · Blood pressure. Have your blood pressure checked during a routine doctor visit. Your doctor will tell you how often to check your blood pressure based on your age, your blood pressure results, and other factors. · Mammogram. Ask your doctor how often you should have a mammogram, which is an X-ray of your breasts. A mammogram can spot breast cancer before it can be felt and when it is easiest to treat. · Pap test and pelvic exam. Ask your doctor how often you should have a Pap test. You may not need to have a Pap test as often as you used to. · Vision. Have your eyes checked every year or two or as often as your doctor suggests. Some experts recommend that you have yearly exams for glaucoma and other age-related eye problems starting at age 48. · Hearing. Tell your doctor if you notice any change in your hearing. You can have tests to find out how well you hear. · Diabetes. Ask your doctor whether you should have tests for diabetes. · Colon cancer. You should begin tests for colon cancer at age 48. You may have one of several tests. Your doctor will tell you how often to have tests based on your age and risk. Risks include whether you already had a precancerous polyp removed from your colon or whether your parents, sisters and brothers, or children have had colon cancer. · Thyroid disease. Talk to your doctor about whether to have your thyroid checked as part of a regular physical exam. Women have an increased chance of a thyroid problem. · Osteoporosis. You should begin tests for bone density at age 72. If you are younger than 72, ask your doctor whether you have factors that may increase your risk for this disease. You may want to have this test before age 72. · Heart attack and stroke risk. At least every 4 to 6 years, you should have your risk for heart attack and stroke assessed. Your doctor uses factors such as your age, blood pressure, cholesterol, and whether you smoke or have diabetes to show what your risk for a heart attack or stroke is over the next 10 years. When should you call for help?  
Watch closely for changes in your health, and be sure to contact your doctor if you have any problems or symptoms that concern you. Where can you learn more? Go to http://juan-maryellen.info/. Enter C822 in the search box to learn more about \"Well Visit, Women 50 to 72: Care Instructions. \" Current as of: March 29, 2018 Content Version: 11.8 © 5620-6583 Healthwise, [employer]. Care instructions adapted under license by [employer] (which disclaims liability or warranty for this information). If you have questions about a medical condition or this instruction, always ask your healthcare professional. [place] [address] any warranty or liability for your use of this information.

## 2018-11-21 NOTE — PROGRESS NOTES
RM 2 
 
Pt presents today with Mom Pt is fasting today Chief Complaint Patient presents with [place] Annual Wellness Visit 1. Have you been to the ER, urgent care clinic since your last visit? Hospitalized since your last visit? No 
 
2. Have you seen or consulted any other health care providers outside of the [facility] since your last visit? Include any pap smears or colon screening. No 
 
Health Maintenance Due Topic Date Due  Shingrix Vaccine Age 50> (1 of 2) 01/18/2013  PAP AKA CERVICAL CYTOLOGY  10/20/2017  MEDICARE YEARLY EXAM  11/16/2018  COLONOSCOPY  04/07/2019

## 2018-11-21 NOTE — LETTER
11/23/2018 10:29 AM 
 
Ms. Melly Huang 
[address] [place] [address] Results for orders placed or performed in visit on 11/21/18 LITHIUM Result Value Ref Range Lithium level 0.4 (L) 0.6 - 1.2 mmol/L  
TSH 3RD GENERATION Result Value Ref Range TSH 1.950 0.450 - 4.500 uIU/mL HEMOGLOBIN A1C WITH EAG Result Value Ref Range Hemoglobin A1c 5.6 4.8 - 5.6 % Estimated average glucose 114 mg/dL METABOLIC PANEL, BASIC Result Value Ref Range Glucose 107 (H) 65 - 99 mg/dL BUN 11 6 - 24 mg/dL Creatinine 0.79 0.57 - 1.00 mg/dL BUN/Creatinine ratio 14 9 - 23 Sodium 145 (H) 134 - 144 mmol/L Potassium 4.5 3.5 - 5.2 mmol/L Chloride 104 96 - 106 mmol/L  
 CO2 28 20 - 29 mmol/L Calcium 9.9 8.7 - 10.2 mg/dL AMB POC TUBERCULOSIS, INTRADERMAL (SKIN TEST) Result Value Ref Range PPD negative Negative  
 mm Induration 0mm mm Sincerely, 
 
 
Antelmo Hall MD

## 2018-11-21 NOTE — PROGRESS NOTES
This is the Subsequent Medicare Annual Wellness Exam, performed 12 months or more after the Initial AWV or the last Subsequent AWV I have reviewed the patient's medical history in detail and updated the computerized patient record. History Past Medical History:  
Diagnosis Date  Arthritis  Bipolar 1 disorder ([address] 12/17/2012  Cervical spine fracture (HCC)  Cervical spine fracture (HCC)  Cyst of breast, right, solitary 11/18/2015  
 mammogram from 11/13/2015, Sue Patient found to have complex cyst in right breast measuring 8mm. Biopsy planned for 11/19/2015  Depression   
 psychotic  Diverticulosis 4/7/14  
 no polyps  Endocrine disease   
 hypothyoidism  Family history of colon cancer  Fibroids 10/14/2009  Fibrosis of uterus  Lumbar vertebral fracture (HCC)  Other and unspecified symptoms and signs involving general sensations and perceptions   
 syncope, BRAIN TRAUMA FROM FALL SEVEN YEARS AGO  Other ill-defined conditions(799.89)   
 uterine fibrosis  Patella fracture  Psychiatric disorder   
 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   
 hypothyroidism w functioning thyroid nodule Past Surgical History:  
Procedure Laterality Date [address] UNLISTED ABLATION  
 HX GYN    
 ABLATION  
 HX HEENT  2007 RECONSTRUCTION OF JAW  
 HX HIP REPLACEMENT Left 2016  HX ORTHOPAEDIC    
 patella fracture RT  
 RECONSTR JAW,FULL,ENDO IMPLNT Current Outpatient Medications Medication Sig Dispense Refill  diphenhydramine HCl (BENADRYL ALLERGY PO) Take  by mouth.  cholecalciferol (VITAMIN D3) 1,000 unit tablet Take 1 Tab by mouth daily. 30 Tab 11  
 docusate sodium (COLACE) 100 mg capsule Take 1 Cap by mouth two (2) times a day.  60 Cap 11  
 levothyroxine (SYNTHROID) 88 mcg tablet Take 1 Tab by mouth Daily (before breakfast). 30 Tab 11  
 oxybutynin (DITROPAN) 5 mg tablet Take 1 Tab by mouth daily. 30 Tab 11  
 polyethylene glycol (MIRALAX) 17 gram/dose powder Take 17 g by mouth two (2) times a day. Hold for lose stool 850 g 11  
 cloZAPine (CLOZARIL) 100 mg tablet Take 4 Tabs by mouth nightly.  lamoTRIgine (LAMICTAL) 150 mg tablet Take 150 mg by mouth nightly.  lithium carbonate (LITHOBID) 600 mg capsule Take 600 mg by mouth nightly. No Known Allergies Family History Problem Relation Age of Onset  Asthma Mother  Asthma Father  Alzheimer Father  No Known Problems Brother Social History Tobacco Use  Smoking status: Never Smoker  Smokeless tobacco: Never Used Substance Use Topics  Alcohol use: No  
  Alcohol/week: 0.0 oz Patient Active Problem List  
Diagnosis Code  Hypothyroidism E03.9  Sleep disorder G47.9  Bipolar 1 disorder (HCC) F31.9  Vitamin D deficiency E55.9  
 OA (osteoarthritis) of knee M17.10  Constipation K59.00  Morbid obesity (HCC) E66.01  
 Elevated fasting glucose R73.01  
 Degenerative joint disease (DJD) of hip M16.9  History of traumatic brain injury Z80.5  
 Family history of colon cancer Z80.0  Depression F32.9  Cyst of left breast N60.02 Depression Risk Factor Screening: PHQ over the last two weeks 11/15/2017 Little interest or pleasure in doing things Not at all Feeling down, depressed, irritable, or hopeless Not at all Total Score PHQ 2 0 Alcohol Risk Factor Screening: You do not drink alcohol or very rarely. Functional Ability and Level of Safety:  
Hearing Loss Hearing is good. Activities of Daily Living The home contains: no safety equipment. Patient needs help with:  transportation, managing medications and managing money Gene Amira helps with medication. Mother helps with finances. She has power of . Step mother Betito Marie may serve as back-up. Fall Risk Fall Risk Assessment, last 12 mths 11/21/2018 Able to walk? Yes Fall in past 12 months? No  
 
Abuse Screen Patient is not abused Cognitive Screening Evaluation of Cognitive Function: 
Has your family/caregiver stated any concerns about your memory: no 
Longstanding decerased cognitive functioning, able to express wants and desires well, but forgets details. Patient Care Team  
Patient Care Team: 
Elvia Pérez MD as PCP - General (Internal Medicine) ALICIA Rondon MD (Orthopedic Surgery) Assessment/Plan Education and counseling provided: 
Are appropriate based on today's review and evaluation End-of-Life planning (with patient's consent) Diagnoses and all orders for this visit: 
 
1. Medicare annual wellness visit, subsequent Well woman (non-gyn) exam: history and exam revealed issues as noted below. Cancer screening:  
Breast: following with gyn Cervical: following with gyn Colon: due soon, referral provided for repeat c-scope. Vaccine status: up to date Cardiovascular risk: BP well controlled, lipid screen. Bone health: daily vit D supplement. Diet and Exercise: not drinking sugary beverages 
  
Health Maintenance Due Topic Date Due  Shingrix Vaccine Age 50> (1 of 2) 01/18/2013  PAP AKA CERVICAL CYTOLOGY  10/20/2017  MEDICARE YEARLY EXAM  11/16/2018  COLONOSCOPY  04/07/2019 HPI Enzo Salcedo is a 52 y.o. female, she presents today for: 
 
Walks with friends 30 minutes. Drinking water instead of sugar beverages. Eating cantides. Thinking of doing a [employer] for further strength training. PMH/PSH: reviewed and updated Sochx:  reports that  has never smoked. she has never used smokeless tobacco. She reports that she does not drink alcohol or use drugs. Famhx: reviewed and updated All: No Known Allergies Med:  
Current Outpatient Medications Medication Sig  
  diphenhydramine HCl (BENADRYL ALLERGY PO) Take  by mouth.  cholecalciferol (VITAMIN D3) 1,000 unit tablet Take 1 Tab by mouth daily.  docusate sodium (COLACE) 100 mg capsule Take 1 Cap by mouth two (2) times a day.  levothyroxine (SYNTHROID) 88 mcg tablet Take 1 Tab by mouth Daily (before breakfast).  oxybutynin (DITROPAN) 5 mg tablet Take 1 Tab by mouth daily.  polyethylene glycol (MIRALAX) 17 gram/dose powder Take 17 g by mouth two (2) times a day. Hold for lose stool  cloZAPine (CLOZARIL) 100 mg tablet Take 4 Tabs by mouth nightly.  lamoTRIgine (LAMICTAL) 150 mg tablet Take 150 mg by mouth nightly.  lithium carbonate (LITHOBID) 600 mg capsule Take 600 mg by mouth nightly. No current facility-administered medications for this visit. Review of Systems Constitutional: Negative for chills, fever and malaise/fatigue. Respiratory: Negative for shortness of breath. Cardiovascular: Negative for chest pain. PE: 
Blood pressure 128/75, pulse 99, temperature 97.9 °F (36.6 °C), temperature source Oral, resp. rate 16, height 5' 1\" (1.549 m), weight 224 lb 6.4 oz (101.8 kg), last menstrual period 06/24/2013, SpO2 94 %. Body mass index is 42.4 kg/m². Physical Exam  
Constitutional: She is oriented to person, place, and time. She appears well-developed and well-nourished. No distress. HENT:  
Mouth/Throat: Oropharynx is clear and moist.  
Eyes: Conjunctivae are normal. Pupils are equal, round, and reactive to light. Neck: Neck supple. No thyromegaly present. Cardiovascular: Normal rate and regular rhythm. Exam reveals no gallop and no friction rub. No murmur heard. Pulmonary/Chest: Effort normal and breath sounds normal. She has no wheezes. Abdominal: She exhibits no distension. There is no tenderness. There is no guarding. Musculoskeletal: She exhibits no edema. Lymphadenopathy:  
  She has no cervical adenopathy. Neurological: She is alert and oriented to person, place, and time. Skin: Capillary refill takes less than 2 seconds. No rash noted. Nursing note and vitals reviewed. Labs:  
No results found for any visits on 11/21/18. A/P: 
52 y.o. female ICD-10-CM ICD-9-CM 1. Medicare annual wellness visit, subsequent Z00.00 V70.0 2. Bipolar 1 disorder ([facility] F31.9 296.7 LITHIUM  
   REFERRAL TO SOCIAL WORK METABOLIC PANEL, BASIC 3. History of traumatic brain injury Z87.820 V15.52 REFERRAL TO SOCIAL WORK  
4. Hypothyroidism, unspecified type E03.9 244.9 TSH 3RD GENERATION 5. Family history of colon cancer Z80.0 V16.0 REFERRAL TO GASTROENTEROLOGY 6. Post-traumatic osteoarthritis of left knee M17.32 715.26   
7. Elevated fasting glucose R73.01 790.21 HEMOGLOBIN A1C WITH EAG  
8. Screening for tuberculosis Z11.1 V74.1 AMB POC TUBERCULOSIS, INTRADERMAL (SKIN TEST) Hypothyroid: on stable dose of levothyroxine. TSH requested for monitoring. 
  
Elevated A1C:  Mild, not on metformin. Continue to focus on avoiding excess sugar, exercise and weight control. Last level in normal ranges.  
  
Morbid obesity: This is a long term struggle for Enrrique. Today she express further frustrations. Vit D deficiency: repeat level to ensure at goal.  
  
Bipolar type 1 disorder: stable and followed with psychiatrist.  
 - lithium level requested for [facility] 
  
Left knee osteoarthritis: improved movement and pain after left hip replacement. Continue to follow-up with Dr. Rossy Mcclure as needed. - She was given AVS and expressed understanding with the diagnosis and plan as discussed. Follow-up Disposition: 
Return in about 5 months (around 4/21/2019) for follow-up blood pressure, weight. Keith Melendez Future Appointments Date Time Provider Mery Brown 4/24/2019  8:30 AM Leslee Fuentes MD [address] [facility]

## 2018-11-22 LAB
BUN SERPL-MCNC: 11 MG/DL (ref 6–24)
BUN/CREAT SERPL: 14 (ref 9–23)
CALCIUM SERPL-MCNC: 9.9 MG/DL (ref 8.7–10.2)
CHLORIDE SERPL-SCNC: 104 MMOL/L (ref 96–106)
CO2 SERPL-SCNC: 28 MMOL/L (ref 20–29)
CREAT SERPL-MCNC: 0.79 MG/DL (ref 0.57–1)
EST. AVERAGE GLUCOSE BLD GHB EST-MCNC: 114 MG/DL
GLUCOSE SERPL-MCNC: 107 MG/DL (ref 65–99)
HBA1C MFR BLD: 5.6 % (ref 4.8–5.6)
LITHIUM SERPL-SCNC: 0.4 MMOL/L (ref 0.6–1.2)
POTASSIUM SERPL-SCNC: 4.5 MMOL/L (ref 3.5–5.2)
SODIUM SERPL-SCNC: 145 MMOL/L (ref 134–144)
TSH SERPL DL<=0.005 MIU/L-ACNC: 1.95 UIU/ML (ref 0.45–4.5)

## 2018-11-22 NOTE — PROGRESS NOTES
Safe range for lithium, good kidney function. A1C remaining in normal range, no sign of elevated blood sugars. This is an indication of improved diet. Thyroid function in normal range, no change in medication dose at this time.

## 2018-11-23 LAB
MM INDURATION POC: NORMAL MM (ref 0–5)
PPD POC: NEGATIVE NEGATIVE

## 2018-11-26 ENCOUNTER — TELEPHONE (OUTPATIENT)
Dept: INTERNAL MEDICINE CLINIC | Age: 53
End: 2018-11-26

## 2018-11-26 NOTE — TELEPHONE ENCOUNTER
Called pt to notify her forms for [facility] were ready. I have mailed her forms to [facility]. Wanted to make sure she didn't want copy of forms or pick them up?

## 2018-11-27 ENCOUNTER — TELEPHONE (OUTPATIENT)
Dept: INTERNAL MEDICINE CLINIC | Age: 53
End: 2018-11-27

## 2018-11-28 NOTE — TELEPHONE ENCOUNTER
Outbound call to patient's mother Carlos Grajeda; listed on HIPAA form 18). Identified self, role and nature of the call. Pt's mother acknowledged understanding. Pt identifiers ( & address) verified per HIPAA policy. Krystal Eason of the call re: POA guardianship. Patient is currently the mother's POA (financial & medical). Mother is [de-identified]years old w/two adult children (patient/son). Mother voiced concern regarding Awais Fernando will assume guardianship, over my daughter when I pass away\"? Conrad Patel Mother further states son lives in [facility], and [place] no sense for him to become her guardian\". Writer provided information to Jenny Ghotra @ [phone] POC is Ruslan Navas.     ARSENIO Barajas

## 2018-12-18 ENCOUNTER — TELEPHONE (OUTPATIENT)
Dept: INTERNAL MEDICINE CLINIC | Age: 53
End: 2018-12-18

## 2018-12-18 NOTE — TELEPHONE ENCOUNTER
Outbound call to patient. Voice message left for patient. F/U call re: POA guardianship. Plan:  -will F/U w/patient on this matter.      Governor ARSENIO Dunlap

## 2018-12-19 NOTE — TELEPHONE ENCOUNTER
Outbound call to patient. Voice message left w/contact information requesting a return call.     ARSENIO Singer

## 2018-12-19 NOTE — TELEPHONE ENCOUNTER
12:24 am, Outbound call to patient's mother Stu Laura). Identified self, role and nature of the call. Patient identifiers ( & address) verified per HIPAA policy. Lucian Hoffman of the call re: POA and guardianship. Patient's mother voiced this issue hasn't been resolved yet. Has an  and will utilize him to handle the issue. ARSENIO Canales      11:53 am,  Voice message left for writer to call back.     ARSENIO Canales

## 2019-04-18 ENCOUNTER — DOCUMENTATION ONLY (OUTPATIENT)
Dept: INTERNAL MEDICINE CLINIC | Age: 54
End: 2019-04-18

## 2019-04-18 NOTE — PROGRESS NOTES
PA received for polyeth glyc pow [address]. Writer attempted to get PA approval, patient not found in cover my meds by name and . Writer spoke with pharmacy who states PA is not needed, patient has been paying out of pocket for this medication, affordable price noted. No further concerns at this time.

## 2019-04-24 ENCOUNTER — OFFICE VISIT (OUTPATIENT)
Dept: INTERNAL MEDICINE CLINIC | Age: 54
End: 2019-04-24

## 2019-04-24 ENCOUNTER — HOSPITAL ENCOUNTER (OUTPATIENT)
Dept: LAB | Age: 54
Discharge: HOME | End: 2019-04-24
Payer: MEDICARE

## 2019-04-24 VITALS
BODY MASS INDEX: 41.35 KG/M2 | OXYGEN SATURATION: 99 % | TEMPERATURE: 98.2 F | HEIGHT: 61 IN | DIASTOLIC BLOOD PRESSURE: 76 MMHG | SYSTOLIC BLOOD PRESSURE: 119 MMHG | HEART RATE: 91 BPM | WEIGHT: 219 LBS | RESPIRATION RATE: 16 BRPM

## 2019-04-24 DIAGNOSIS — E55.9 VITAMIN D DEFICIENCY: ICD-10-CM

## 2019-04-24 DIAGNOSIS — N39.3 STRESS INCONTINENCE: ICD-10-CM

## 2019-04-24 DIAGNOSIS — E66.01 MORBID OBESITY (HCC): Primary | ICD-10-CM

## 2019-04-24 DIAGNOSIS — E03.9 HYPOTHYROIDISM, UNSPECIFIED TYPE: ICD-10-CM

## 2019-04-24 DIAGNOSIS — F31.9 BIPOLAR 1 DISORDER (HCC): ICD-10-CM

## 2019-04-24 DIAGNOSIS — K59.00 CONSTIPATION, UNSPECIFIED CONSTIPATION TYPE: ICD-10-CM

## 2019-04-24 DIAGNOSIS — Z12.11 SCREEN FOR COLON CANCER: ICD-10-CM

## 2019-04-24 DIAGNOSIS — R73.01 ELEVATED FASTING GLUCOSE: ICD-10-CM

## 2019-04-24 PROCEDURE — 84443 ASSAY THYROID STIM HORMONE: CPT

## 2019-04-24 PROCEDURE — 80053 COMPREHEN METABOLIC PANEL: CPT

## 2019-04-24 PROCEDURE — 80178 ASSAY OF LITHIUM: CPT

## 2019-04-24 PROCEDURE — 85027 COMPLETE CBC AUTOMATED: CPT

## 2019-04-24 RX ORDER — POLYETHYLENE GLYCOL 3350 17 G/17G
POWDER, FOR SOLUTION ORAL
Qty: 1530 G | Refills: 11 | Status: SHIPPED | OUTPATIENT
Start: 2019-04-24 | End: 2021-10-12 | Stop reason: SDUPTHER

## 2019-04-24 RX ORDER — OXYBUTYNIN CHLORIDE 5 MG/1
5 TABLET ORAL DAILY
Qty: 30 TAB | Refills: 11 | Status: SHIPPED | OUTPATIENT
Start: 2019-04-24 | End: 2020-05-28 | Stop reason: SDUPTHER

## 2019-04-24 RX ORDER — DOCUSATE SODIUM 100 MG/1
100 CAPSULE, LIQUID FILLED ORAL 2 TIMES DAILY
Qty: 60 CAP | Refills: 11 | Status: SHIPPED | OUTPATIENT
Start: 2019-04-24 | End: 2020-07-14 | Stop reason: SDUPTHER

## 2019-04-24 RX ORDER — LEVOTHYROXINE SODIUM 88 UG/1
88 TABLET ORAL
Qty: 30 TAB | Refills: 11 | Status: SHIPPED | OUTPATIENT
Start: 2019-04-24 | End: 2019-07-22 | Stop reason: SDUPTHER

## 2019-04-24 RX ORDER — MELATONIN
1000 DAILY
Qty: 30 TAB | Refills: 11 | Status: SHIPPED | OUTPATIENT
Start: 2019-04-24 | End: 2020-07-14 | Stop reason: SDUPTHER

## 2019-04-24 NOTE — PATIENT INSTRUCTIONS
1. Please make appointment with either me or gynecologist to complete pap test.   2. Please make appointment with Dr. Sudhir Valero or other for your colon cancer screening test.

## 2019-04-24 NOTE — PROGRESS NOTES
RM 2    Patient presents accompanied by her mother , Patient reports she is fasting for lab work    Chief Complaint   Patient presents with    Follow-up     6 month follow up for BP and weight      1. Have you been to the ER, urgent care clinic since your last visit? Hospitalized since your last visit? No    2. Have you seen or consulted any other health care providers outside of the [address] [place] [address] since your last visit? Include any pap smears or colon screening. Dentist for cleaning           Health Maintenance Due   Topic Date Due    Shingrix Vaccine Age 50> (1 of 2) 01/18/2013    PAP AKA CERVICAL CYTOLOGY  10/20/2017    COLONOSCOPY  04/07/2019       Abuse Screening Questionnaire 11/21/2018   Do you ever feel afraid of your partner? N   Are you in a relationship with someone who physically or mentally threatens you? N   Is it safe for you to go home?  Y     3 most recent PHQ Screens 11/15/2017   Little interest or pleasure in doing things Not at all   Feeling down, depressed, irritable, or hopeless Not at all   Total Score PHQ 2 0     Learning Assessment 10/20/2014   PRIMARY LEARNER Healthcare POA   PRIMARY LANGUAGE ENGLISH   LEARNER PREFERENCE PRIMARY LISTENING     READING   ANSWERED BY Mother   RELATIONSHIP GRANDPARENT

## 2019-04-24 NOTE — PROGRESS NOTES
VIVI Ford is a 62 y.o. female, she presents today for:    Presents with mother. No new concerns. [employer] is stating they will not cover miralax generic. Takes a 20 minute walk around the block most days. To start water aerobics next week. Just water for beverages. PMH/PSH: reviewed and updated  Sochx:  reports that she has never smoked. She has never used smokeless tobacco. She reports that she does not drink alcohol or use drugs. Famhx: reviewed and updated     All: No Known Allergies  Med:   Current Outpatient Medications   Medication Sig    diphenhydramine HCl (BENADRYL ALLERGY PO) Take  by mouth.  cholecalciferol (VITAMIN D3) 1,000 unit tablet Take 1 Tab by mouth daily.  docusate sodium (COLACE) 100 mg capsule Take 1 Cap by mouth two (2) times a day.  levothyroxine (SYNTHROID) 88 mcg tablet Take 1 Tab by mouth Daily (before breakfast).  oxybutynin (DITROPAN) 5 mg tablet Take 1 Tab by mouth daily.  polyethylene glycol (MIRALAX) 17 gram/dose powder Take 17 g by mouth two (2) times a day. Hold for lose stool    cloZAPine (CLOZARIL) 100 mg tablet Take 4 Tabs by mouth nightly.  lamoTRIgine (LAMICTAL) 150 mg tablet Take 150 mg by mouth nightly.  lithium carbonate (LITHOBID) 600 mg capsule Take 600 mg by mouth nightly. No current facility-administered medications for this visit. Review of Systems   Constitutional: Negative for chills, fever and malaise/fatigue. Respiratory: Negative for shortness of breath. Cardiovascular: Negative for chest pain. PE:  Blood pressure 119/76, pulse 91, temperature 98.2 °F (36.8 °C), temperature source Oral, resp. rate 16, height 5' 1\" (1.549 m), weight 219 lb (99.3 kg), last menstrual period 06/24/2013, SpO2 99 %. Body mass index is 41.38 kg/m². Physical Exam   Constitutional: She is oriented to person, place, and time. She appears well-developed and well-nourished. No distress.    HENT:   Head: Normocephalic. Mouth/Throat: Oropharynx is clear and moist.   Eyes: Conjunctivae and EOM are normal.   Neck: Neck supple. Cardiovascular: Normal rate, regular rhythm and normal heart sounds. Pulmonary/Chest: Effort normal and breath sounds normal.   Neurological: She is alert and oriented to person, place, and time. Skin: Skin is warm and dry. Nursing note and vitals reviewed. Labs:   See addendum    A/P:  62 y.o. female    ICD-10-CM ICD-9-CM    1. Morbid obesity (HCC) E66.01 278.01 CBC W/O DIFF      METABOLIC PANEL, COMPREHENSIVE   2. Elevated fasting glucose R73.01 790.21 CBC W/O DIFF      METABOLIC PANEL, COMPREHENSIVE   3. Vitamin D deficiency E55.9 268.9 cholecalciferol (VITAMIN D3) 1,000 unit tablet      CBC W/O DIFF      METABOLIC PANEL, COMPREHENSIVE   4. Screen for colon cancer Z12.11 V76.51 REFERRAL TO GASTROENTEROLOGY   5. Stress incontinence N39.3 [id number] oxybutynin (DITROPAN) 5 mg tablet   6. Hypothyroidism, unspecified type E03.9 244.9 TSH 3RD GENERATION      levothyroxine (SYNTHROID) 88 mcg tablet      CBC W/O DIFF      METABOLIC PANEL, COMPREHENSIVE   7. Constipation, unspecified constipation type K59.00 564.00 polyethylene glycol (MIRALAX) 17 gram/dose powder      docusate sodium (COLACE) 100 mg capsule   8. Bipolar 1 disorder (HCC) F31.9 296.7 LITHIUM      CBC W/O DIFF      METABOLIC PANEL, COMPREHENSIVE     Morbid obesity. Elevated fasting glucose: continue to discuss importance of walking to increase to 2 times daily in good weather. Labs to follow-up hypothyroidism requested     [employer] not covering generic miralax. Will send as brand to see if this is covered. Bipolar, well controlled, continues with psychiatrist.    Plan for follow-up colon cancer screening later this year. - She was given AVS and expressed understanding with the diagnosis and plan as discussed.     Future Appointments   Date Time Provider Mery Brown   11/27/2019  8:30 AM Jade Gomez Melisa [place], [address]

## 2019-04-25 LAB
ALBUMIN SERPL-MCNC: 4.6 G/DL (ref 3.5–5.5)
ALBUMIN/GLOB SERPL: 2.1 {RATIO} (ref 1.2–2.2)
ALP SERPL-CCNC: 116 IU/L (ref 39–117)
ALT SERPL-CCNC: 20 IU/L (ref 0–32)
AST SERPL-CCNC: 17 IU/L (ref 0–40)
BILIRUB SERPL-MCNC: 0.4 MG/DL (ref 0–1.2)
BUN SERPL-MCNC: 12 MG/DL (ref 6–24)
BUN/CREAT SERPL: 13 (ref 9–23)
CALCIUM SERPL-MCNC: 10 MG/DL (ref 8.7–10.2)
CHLORIDE SERPL-SCNC: 107 MMOL/L (ref 96–106)
CO2 SERPL-SCNC: 25 MMOL/L (ref 20–29)
CREAT SERPL-MCNC: 0.91 MG/DL (ref 0.57–1)
ERYTHROCYTE [DISTWIDTH] IN BLOOD BY AUTOMATED COUNT: 14.6 % (ref 12.3–15.4)
GLOBULIN SER CALC-MCNC: 2.2 G/DL (ref 1.5–4.5)
GLUCOSE SERPL-MCNC: 106 MG/DL (ref 65–99)
HCT VFR BLD AUTO: 39.9 % (ref 34–46.6)
HGB BLD-MCNC: 12.7 G/DL (ref 11.1–15.9)
LITHIUM SERPL-SCNC: 0.5 MMOL/L (ref 0.6–1.2)
MCH RBC QN AUTO: 28.5 PG (ref 26.6–33)
MCHC RBC AUTO-ENTMCNC: 31.8 G/DL (ref 31.5–35.7)
MCV RBC AUTO: 90 FL (ref 79–97)
PLATELET # BLD AUTO: 202 X10E3/UL (ref 150–379)
POTASSIUM SERPL-SCNC: 4.4 MMOL/L (ref 3.5–5.2)
PROT SERPL-MCNC: 6.8 G/DL (ref 6–8.5)
RBC # BLD AUTO: 4.45 X10E6/UL (ref 3.77–5.28)
SODIUM SERPL-SCNC: 145 MMOL/L (ref 134–144)
TSH SERPL DL<=0.005 MIU/L-ACNC: 1.29 UIU/ML (ref 0.45–4.5)
WBC # BLD AUTO: 8.9 X10E3/UL (ref 3.4–10.8)

## 2019-04-26 ENCOUNTER — DOCUMENTATION ONLY (OUTPATIENT)
Dept: INTERNAL MEDICINE CLINIC | Age: 54
End: 2019-04-26

## 2019-04-26 NOTE — PROGRESS NOTES
Faxed Form's to [address] [place] [address] on 4/26/19-Fax # [phone],[id number] received document placed in bin for Centralized Scanning.

## 2019-04-26 NOTE — PROGRESS NOTES
Thyroid function in goal range. Continue current dose of medication. Lithium level in low, stable range. Non anemia, blood counts look very good. Normal kidney function, liver function and electrolytes.   Letter printed to be sent to psychiatric team.

## 2019-07-22 DIAGNOSIS — E03.9 HYPOTHYROIDISM, UNSPECIFIED TYPE: ICD-10-CM

## 2019-07-22 NOTE — TELEPHONE ENCOUNTER
Pt is on vacation and left her Synthroid 88 mcg at home,pt would like if  could call her in enough for 10 day's? Pt has already gone (3) day's without it and would like if  could approve it today. Pt would like it sent to:     [facility] there address:  [address] [employer] # [phone]

## 2019-07-23 RX ORDER — LEVOTHYROXINE SODIUM 88 UG/1
88 TABLET ORAL
Qty: 10 TAB | Refills: 0 | OUTPATIENT
Start: 2019-07-23 | End: 2020-07-14 | Stop reason: SDUPTHER

## 2019-07-23 NOTE — TELEPHONE ENCOUNTER
Called pt back, no answer. Left vm for pt Mother to call  Our  office back  At [phone] in regards to medication requested and need for a pharmacy.  Awaiting call back

## 2019-07-25 NOTE — TELEPHONE ENCOUNTER
Spoke to patient who stated she is not on vacation and there was some confusion. The patient was unaware she had prescription refills available at her [address], she does not need a temporary supply. She will  her prescription from the pharmacy.  She quiroga snot need anything from us at this time

## 2019-07-29 ENCOUNTER — DOCUMENTATION ONLY (OUTPATIENT)
Dept: INTERNAL MEDICINE CLINIC | Age: 54
End: 2019-07-29

## 2019-07-29 NOTE — PROGRESS NOTES
Office visit notes from 4/24/19 and 11/21/18 faxed to [address] [place] [address] edith Gomez- HAYDER # [phone]

## 2019-11-26 NOTE — PROGRESS NOTES
RM 2    Chief Complaint   Patient presents with    Cyst     patient reports cyst near groin area     Annual Wellness Visit         Other     patient reports when she swollows food she makes sounds that concern people    Mass     wants skin growth on her back assessed        1. Have you been to the ER, urgent care clinic since your last visit? Hospitalized since your last visit? No    2. Have you seen or consulted any other health care providers outside of the [address] [place] [address] [facility] since your last visit? Include any pap smears or colon screening. Patient reports had pap smear when saw Dr. Escalante Flower Maintenance Due   Topic Date Due    Shingrix Vaccine Age 50> (1 of 2) 01/18/2013    PAP AKA CERVICAL CYTOLOGY  10/20/2017    COLONOSCOPY  04/07/2019    MEDICARE YEARLY EXAM  11/22/2019       3 most recent PHQ Screens 11/27/2019   Little interest or pleasure in doing things Not at all   Feeling down, depressed, irritable, or hopeless Not at all   Total Score PHQ 2 0     Fall Risk Assessment, last 12 mths 11/21/2018   Able to walk? Yes   Fall in past 12 months? No     Abuse Screening Questionnaire 11/27/2019   Do you ever feel afraid of your partner? N   Are you in a relationship with someone who physically or mentally threatens you? N   Is it safe for you to go home?  Y     ADL Assessment 11/27/2019   Feeding yourself No Help Needed   Getting from bed to chair No Help Needed   Getting dressed No Help Needed   Bathing or showering No Help Needed   Walk across the room (includes cane/walker) No Help Needed   Using the telphone No Help Needed   Taking your medications Help Needed   Preparing meals Help Needed   Managing money (expenses/bills) Help Needed   Moderately strenuous housework (laundry) No Help Needed   Shopping for personal items (toiletries/medicines) No Help Needed   Shopping for groceries No Help Needed   Driving Help Needed   Climbing a flight of stairs No Help Needed   Getting to places beyond walking distances Help Needed       Learning Assessment 10/20/2014   PRIMARY LEARNER Healthcare POA   PRIMARY LANGUAGE ENGLISH   LEARNER PREFERENCE PRIMARY LISTENING     READING   ANSWERED BY Mother   RELATIONSHIP [facility]

## 2019-11-27 ENCOUNTER — HOSPITAL ENCOUNTER (OUTPATIENT)
Dept: LAB | Age: 54
Discharge: HOME | End: 2019-11-27
Payer: MEDICARE

## 2019-11-27 ENCOUNTER — OFFICE VISIT (OUTPATIENT)
Dept: INTERNAL MEDICINE CLINIC | Age: 54
End: 2019-11-27

## 2019-11-27 VITALS
WEIGHT: 224 LBS | BODY MASS INDEX: 42.29 KG/M2 | OXYGEN SATURATION: 91 % | DIASTOLIC BLOOD PRESSURE: 67 MMHG | SYSTOLIC BLOOD PRESSURE: 109 MMHG | HEIGHT: 61 IN | HEART RATE: 89 BPM | RESPIRATION RATE: 18 BRPM | TEMPERATURE: 97.9 F

## 2019-11-27 DIAGNOSIS — L82.1 SEBORRHEIC KERATOSIS: ICD-10-CM

## 2019-11-27 DIAGNOSIS — L91.8 SKIN TAG: ICD-10-CM

## 2019-11-27 DIAGNOSIS — Z87.820 HISTORY OF TRAUMATIC BRAIN INJURY: ICD-10-CM

## 2019-11-27 DIAGNOSIS — Z13.220 SCREENING FOR LIPID DISORDERS: ICD-10-CM

## 2019-11-27 DIAGNOSIS — E66.01 MORBID OBESITY (HCC): ICD-10-CM

## 2019-11-27 DIAGNOSIS — R73.01 ELEVATED FASTING GLUCOSE: ICD-10-CM

## 2019-11-27 DIAGNOSIS — E55.9 VITAMIN D DEFICIENCY: ICD-10-CM

## 2019-11-27 DIAGNOSIS — E03.9 HYPOTHYROIDISM, UNSPECIFIED TYPE: ICD-10-CM

## 2019-11-27 DIAGNOSIS — Z00.00 MEDICARE ANNUAL WELLNESS VISIT, SUBSEQUENT: Primary | ICD-10-CM

## 2019-11-27 DIAGNOSIS — F31.9 BIPOLAR 1 DISORDER (HCC): ICD-10-CM

## 2019-11-27 PROCEDURE — 83036 HEMOGLOBIN GLYCOSYLATED A1C: CPT

## 2019-11-27 PROCEDURE — 80178 ASSAY OF LITHIUM: CPT

## 2019-11-27 PROCEDURE — 82306 VITAMIN D 25 HYDROXY: CPT

## 2019-11-27 PROCEDURE — 80048 BASIC METABOLIC PNL TOTAL CA: CPT

## 2019-11-27 PROCEDURE — 84443 ASSAY THYROID STIM HORMONE: CPT

## 2019-11-27 PROCEDURE — 80061 LIPID PANEL: CPT

## 2019-11-27 NOTE — PROGRESS NOTES
This is the Subsequent Medicare Annual Wellness Exam, performed 12 months or more after the Initial AWV or the last Subsequent AWV    I have reviewed the patient's medical history in detail and updated the computerized patient record. History      - reports a new cyst or abscess on back. Several in place.    -May have bee there for a while. Eating:    - no history of cough nor choking while swallowing. Pinkple near groing area. Like a pimple. Not open not draining. Taking benadryl morning and evening. To help with tremor. As well as sleeping at night. Currently cutting back from 400 to 300 clozapril. Patient Active Problem List   Diagnosis Code    Hypothyroidism E03.9    Sleep disorder G47.9    Bipolar 1 disorder ([address] F31.9    Vitamin D deficiency E55.9    OA (osteoarthritis) of knee M17.10    Constipation K59.00    Morbid obesity ([address] E66.01    Elevated fasting glucose R73.01    Degenerative joint disease (DJD) of hip M16.9    History of traumatic brain injury Z87.820    Family history of colon cancer Z80.0    Depression F32.9    Cyst of left breast N60.02     Past Medical History:   Diagnosis Date    Arthritis     Bipolar 1 disorder ([address] 12/17/2012    Cervical spine fracture ([address]     Cervical spine fracture ([address]     Cyst of breast, right, solitary 11/18/2015    mammogram from 11/13/2015, Sue Patient found to have complex cyst in right breast measuring 8mm.   Biopsy planned for 11/19/2015     Depression     psychotic    Diverticulosis 4/7/14    no polyps    Endocrine disease     hypothyoidism    Family history of colon cancer     Fibroids 10/14/2009    Fibrosis of uterus     Lumbar vertebral fracture (HCC)     Other and unspecified symptoms and signs involving general sensations and perceptions     syncope, BRAIN TRAUMA FROM FALL SEVEN YEARS AGO    Other ill-defined conditions(799.89)     uterine fibrosis    Patella fracture     Psychiatric disorder depression, bipolar    Psychotic depressive reaction ([facility] 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/2009    Syncope     TBI (traumatic brain injury) ([facility]     Thyroid disease     hypothyroidism w functioning thyroid nodule      Past Surgical History:   Procedure Laterality Date    ABDOMEN SURGERY PROC UNLISTED      ABLATION    HX GYN      ABLATION    HX HEENT  2007    RECONSTRUCTION OF JAW    HX HIP REPLACEMENT Left 2016    HX ORTHOPAEDIC      patella fracture RT    RECONSTR JAW,FULL,ENDO IMPLNT       Current Outpatient Medications   Medication Sig Dispense Refill    levothyroxine (SYNTHROID) 88 mcg tablet Take 1 Tab by mouth Daily (before breakfast). 10 Tab 0    polyethylene glycol (MIRALAX) 17 gram/dose powder Take 17 g by mouth two (2) times a day. Hold for lose stool 1530 g 11    oxybutynin (DITROPAN) 5 mg tablet Take 1 Tab by mouth daily. 30 Tab 11    docusate sodium (COLACE) 100 mg capsule Take 1 Cap by mouth two (2) times a day. 60 Cap 11    cholecalciferol (VITAMIN D3) 1,000 unit tablet Take 1 Tab by mouth daily. 30 Tab 11    diphenhydramine HCl (BENADRYL ALLERGY PO) Take  by mouth.  cloZAPine (CLOZARIL) 100 mg tablet Take 300 mg by mouth nightly.  lamoTRIgine (LAMICTAL) 150 mg tablet Take 150 mg by mouth nightly.  lithium carbonate (LITHOBID) 600 mg capsule Take 600 mg by mouth nightly.        No Known Allergies    Family History   Problem Relation Age of Onset    Asthma Mother     Asthma Father     Alzheimer Father     No Known Problems Brother      Social History     Tobacco Use    Smoking status: Never Smoker    Smokeless tobacco: Never Used   Substance Use Topics    Alcohol use: No     Alcohol/week: 0.0 standard drinks       Depression Risk Factor Screening:     3 most recent PHQ Screens 11/27/2019   Little interest or pleasure in doing things Not at all   Feeling down, depressed, irritable, or hopeless Not at all   Total Score PHQ 2 0       Alcohol Risk Factor Screening:   Do you average 1 drink per night or more than 7 drinks a week:  No    On any one occasion in the past three months have you have had more than 3 drinks containing alcohol:  No    Functional Ability and Level of Safety:   Hearing: Hearing is good. Activities of Daily Living: The home contains: rugs  Patient does total self care    Ambulation: with no difficulty    Fall Risk:  Fall Risk Assessment, last 12 mths 11/21/2018   Able to walk? Yes   Fall in past 12 months? No     Abuse Screen:  Patient is not abused    Cognitive Screening   Follow-up with psychiatrist    Physical Exam  Vitals signs and nursing note reviewed. Constitutional:       General: She is not in acute distress. Appearance: She is obese. Comments: Presents with mother   HENT:      Head: Normocephalic. Eyes:      Conjunctiva/sclera: Conjunctivae normal.   Neck:      Musculoskeletal: Neck supple. Cardiovascular:      Rate and Rhythm: Normal rate and regular rhythm. Heart sounds: Normal heart sounds. Pulmonary:      Effort: Pulmonary effort is normal.      Breath sounds: Normal breath sounds. Skin:     General: Skin is warm and dry. Comments: Skin tag in inner thigh, no sign of active infection. Neurological:      Mental Status: She is alert and oriented to person, place, and time. Patient Care Team   Patient Care Team:  Vidhya Martinez MD as PCP - General (Internal Medicine)  Vidhya Martinez MD as PCP - [facility]  ALICIA Chapman MD (Orthopedic Surgery)    Assessment/Plan   Education and counseling provided:  Are appropriate based on today's review and evaluation    Diagnoses and all orders for this visit:    1. Medicare annual wellness visit, subsequent    2. Hypothyroidism, unspecified type  -     TSH 3RD GENERATION    3. Bipolar 1 disorder ([facility]  -     LITHIUM    4. History of traumatic brain injury    5.  Elevated fasting glucose  -     HEMOGLOBIN A1C WITH EAG  -     METABOLIC PANEL, BASIC    6. Morbid obesity ([address]  -     METABOLIC PANEL, BASIC    7. Vitamin D deficiency  -     VITAMIN D, 25 HYDROXY    8. Screening for lipid disorders  -     LIPID PANEL    9. Seborrheic keratosis  -     REFERRAL TO DERMATOLOGY    10. Skin tag  -     REFERRAL TO DERMATOLOGY    Well woman (non-gyn) exam: history and exam revealed issues as noted below. Cancer screening:    - breast: mammogram requested. - cervical: done with [facility] appointment completed last month. - colon: due  - scheduled in [place]  Vaccine status: up to date   Cardiovascular risk: BP well controlled, lipid screen requested  Bone health: daily vit D supplement yes and screen for   Diet and Exercise: limited in current setting in group home. Maintaining current weight. Continue to encourage avoidance of sugary beverages. Check lithium level for psychiatric team.   Check kidney level,   Euthyroid on exam, repeat lab level today. Health Maintenance Due   Topic Date Due    Shingrix Vaccine Age 50> (1 of 2) 01/18/2013    PAP AKA CERVICAL CYTOLOGY  10/20/2017    COLONOSCOPY  04/07/2019    MEDICARE YEARLY EXAM  11/22/2019     Follow-up and Dispositions    · Return in about 6 months (around 5/27/2020) for follow-up hypothyroid.

## 2019-11-27 NOTE — PATIENT INSTRUCTIONS
Medicare Wellness Visit, Female The best way to live healthy is to have a lifestyle where you eat a well-balanced diet, exercise regularly, limit alcohol use, and quit all forms of tobacco/nicotine, if applicable. Regular preventive services are another way to keep healthy. Preventive services (vaccines, screening tests, monitoring & exams) can help personalize your care plan, which helps you manage your own care. Screening tests can find health problems at the earliest stages, when they are easiest to treat. [facility] follows the current, evidence-based guidelines published by the [facility] Neftali Valverde ([facility]) when recommending preventive services for our patients. Because we follow these guidelines, sometimes recommendations change over time as research supports it. (For example, mammograms used to be recommended annually. Even though Medicare will still pay for an annual mammogram, the newer guidelines recommend a mammogram every two years for women of average risk). Of course, you and your doctor may decide to screen more often for some diseases, based on your risk and your co-morbidities (chronic disease you are already diagnosed with). Preventive services for you include: - Medicare offers their members a free annual wellness visit, which is time for you and your primary care provider to discuss and plan for your preventive service needs. Take advantage of this benefit every year! 
-All adults over the age of 72 should receive the recommended pneumonia vaccines. Current USPSTF guidelines recommend a series of two vaccines for the best pneumonia protection.  
-All adults should have a flu vaccine yearly and a tetanus vaccine every 10 years.  
-All adults age 48 and older should receive the shingles vaccines (series of two vaccines). -All adults age 38-68 who are overweight should have a diabetes screening test once every three years. -All adults born between [birth year] and 1967 should be screened once for Hepatitis C. 
-Other screening tests and preventive services for persons with diabetes include: an eye exam to screen for diabetic retinopathy, a kidney function test, a foot exam, and stricter control over your cholesterol.  
-Cardiovascular screening for adults with routine risk involves an electrocardiogram (ECG) at intervals determined by your doctor.  
-Colorectal cancer screenings should be done for adults age 54-65 with no increased risk factors for colorectal cancer. There are a number of acceptable methods of screening for this type of cancer. Each test has its own benefits and drawbacks. Discuss with your doctor what is most appropriate for you during your annual wellness visit. The different tests include: colonoscopy (considered the best screening method), a fecal occult blood test, a fecal DNA test, and sigmoidoscopy. 
 
-A bone mass density test is recommended when a woman turns 65 to screen for osteoporosis. This test is only recommended one time, as a screening. Some providers will use this same test as a disease monitoring tool if you already have osteoporosis. -Breast cancer screenings are recommended every other year for women of normal risk, age 54-69. 
-Cervical cancer screenings for women over age 72 are only recommended with certain risk factors. Here is a list of your current Health Maintenance items (your personalized list of preventive services) with a due date: 
Health Maintenance Due Topic Date Due  Shingles Vaccine (1 of 2) 01/18/2013  Pap Test  10/20/2017  Colonoscopy  04/07/2019 [facility] Annual Well Visit  11/22/2019

## 2019-11-28 LAB
25(OH)D3+25(OH)D2 SERPL-MCNC: 25.6 NG/ML (ref 30–100)
BUN SERPL-MCNC: 11 MG/DL (ref 6–24)
BUN/CREAT SERPL: 11 (ref 9–23)
CALCIUM SERPL-MCNC: 10.2 MG/DL (ref 8.7–10.2)
CHLORIDE SERPL-SCNC: 104 MMOL/L (ref 96–106)
CHOLEST SERPL-MCNC: 173 MG/DL (ref 100–199)
CO2 SERPL-SCNC: 26 MMOL/L (ref 20–29)
CREAT SERPL-MCNC: 0.96 MG/DL (ref 0.57–1)
EST. AVERAGE GLUCOSE BLD GHB EST-MCNC: 117 MG/DL
GLUCOSE SERPL-MCNC: 110 MG/DL (ref 65–99)
HBA1C MFR BLD: 5.7 % (ref 4.8–5.6)
HDLC SERPL-MCNC: 46 MG/DL
LDLC SERPL CALC-MCNC: 106 MG/DL (ref 0–99)
LITHIUM SERPL-SCNC: 0.4 MMOL/L (ref 0.6–1.2)
POTASSIUM SERPL-SCNC: 4.2 MMOL/L (ref 3.5–5.2)
SODIUM SERPL-SCNC: 145 MMOL/L (ref 134–144)
TRIGL SERPL-MCNC: 106 MG/DL (ref 0–149)
TSH SERPL DL<=0.005 MIU/L-ACNC: 2.17 UIU/ML (ref 0.45–4.5)
VLDLC SERPL CALC-MCNC: 21 MG/DL (ref 5–40)

## 2019-11-29 NOTE — PROGRESS NOTES
except for mild rise in a1c (average blood sugar) so it is officially in risk range for diabetes, overall labs look good. I am going to send a fax over to [place] [facility] team as we did in April. Please encourage Poornima Caro to avoid sugary foods and beverages. If A1C rises at next check, we may want to consider addition of metformin. Will also continue to monitor cholesterol, but this most recent check is reasonable. Good HDL as over 40 and LDL okay for age and risk factors, it has been lower in the past which is preferred.

## 2019-12-06 DIAGNOSIS — Z12.11 SCREEN FOR COLON CANCER: Primary | ICD-10-CM

## 2020-01-06 ENCOUNTER — ANESTHESIA EVENT (OUTPATIENT)
Dept: ENDOSCOPY | Age: 55
End: 2020-01-06
Payer: MEDICARE

## 2020-01-06 ENCOUNTER — HOSPITAL ENCOUNTER (OUTPATIENT)
Age: 55
Setting detail: OUTPATIENT SURGERY
Discharge: HOME | End: 2020-01-06
Attending: INTERNAL MEDICINE | Admitting: INTERNAL MEDICINE
Payer: MEDICARE

## 2020-01-06 ENCOUNTER — ANESTHESIA (OUTPATIENT)
Dept: ENDOSCOPY | Age: 55
End: 2020-01-06
Payer: MEDICARE

## 2020-01-06 VITALS
RESPIRATION RATE: 24 BRPM | SYSTOLIC BLOOD PRESSURE: 115 MMHG | TEMPERATURE: 97.7 F | HEART RATE: 83 BPM | OXYGEN SATURATION: 96 % | DIASTOLIC BLOOD PRESSURE: 59 MMHG

## 2020-01-06 PROCEDURE — 77030009426 HC FCPS BIOP ENDOSC BSC -B: Performed by: INTERNAL MEDICINE

## 2020-01-06 PROCEDURE — 77030010936 HC CLP LIG BSC -C: Performed by: INTERNAL MEDICINE

## 2020-01-06 PROCEDURE — 74011250636 HC RX REV CODE- 250/636: Performed by: NURSE ANESTHETIST, CERTIFIED REGISTERED

## 2020-01-06 PROCEDURE — 88305 TISSUE EXAM BY PATHOLOGIST: CPT

## 2020-01-06 PROCEDURE — 74011000250 HC RX REV CODE- 250: Performed by: NURSE ANESTHETIST, CERTIFIED REGISTERED

## 2020-01-06 PROCEDURE — 76060000032 HC ANESTHESIA 0.5 TO 1 HR: Performed by: INTERNAL MEDICINE

## 2020-01-06 PROCEDURE — 76040000007: Performed by: INTERNAL MEDICINE

## 2020-01-06 PROCEDURE — 77030013992 HC SNR POLYP ENDOSC BSC -B: Performed by: INTERNAL MEDICINE

## 2020-01-06 PROCEDURE — 74011250636 HC RX REV CODE- 250/636: Performed by: INTERNAL MEDICINE

## 2020-01-06 PROCEDURE — 74011250637 HC RX REV CODE- 250/637: Performed by: INTERNAL MEDICINE

## 2020-01-06 RX ORDER — ATROPINE SULFATE 0.1 MG/ML
0.5 INJECTION INTRAVENOUS
Status: DISCONTINUED | OUTPATIENT
Start: 2020-01-06 | End: 2020-01-06 | Stop reason: HOSPADM

## 2020-01-06 RX ORDER — NALOXONE HYDROCHLORIDE 0.4 MG/ML
0.4 INJECTION, SOLUTION INTRAMUSCULAR; INTRAVENOUS; SUBCUTANEOUS
Status: DISCONTINUED | OUTPATIENT
Start: 2020-01-06 | End: 2020-01-06 | Stop reason: HOSPADM

## 2020-01-06 RX ORDER — SODIUM CHLORIDE 0.9 % (FLUSH) 0.9 %
5-40 SYRINGE (ML) INJECTION AS NEEDED
Status: DISCONTINUED | OUTPATIENT
Start: 2020-01-06 | End: 2020-01-06 | Stop reason: HOSPADM

## 2020-01-06 RX ORDER — SODIUM CHLORIDE 9 MG/ML
INJECTION, SOLUTION INTRAVENOUS
Status: DISCONTINUED | OUTPATIENT
Start: 2020-01-06 | End: 2020-01-06 | Stop reason: HOSPADM

## 2020-01-06 RX ORDER — SODIUM CHLORIDE 9 MG/ML
50 INJECTION, SOLUTION INTRAVENOUS CONTINUOUS
Status: DISCONTINUED | OUTPATIENT
Start: 2020-01-06 | End: 2020-01-06 | Stop reason: HOSPADM

## 2020-01-06 RX ORDER — FENTANYL CITRATE 50 UG/ML
25-200 INJECTION, SOLUTION INTRAMUSCULAR; INTRAVENOUS
Status: DISCONTINUED | OUTPATIENT
Start: 2020-01-06 | End: 2020-01-06 | Stop reason: HOSPADM

## 2020-01-06 RX ORDER — FLUMAZENIL 0.1 MG/ML
0.2 INJECTION INTRAVENOUS
Status: DISCONTINUED | OUTPATIENT
Start: 2020-01-06 | End: 2020-01-06 | Stop reason: HOSPADM

## 2020-01-06 RX ORDER — SODIUM CHLORIDE 0.9 % (FLUSH) 0.9 %
5-40 SYRINGE (ML) INJECTION EVERY 8 HOURS
Status: DISCONTINUED | OUTPATIENT
Start: 2020-01-06 | End: 2020-01-06 | Stop reason: HOSPADM

## 2020-01-06 RX ORDER — LIDOCAINE HYDROCHLORIDE 20 MG/ML
INJECTION, SOLUTION EPIDURAL; INFILTRATION; INTRACAUDAL; PERINEURAL AS NEEDED
Status: DISCONTINUED | OUTPATIENT
Start: 2020-01-06 | End: 2020-01-06 | Stop reason: HOSPADM

## 2020-01-06 RX ORDER — MIDAZOLAM HYDROCHLORIDE 1 MG/ML
.25-5 INJECTION, SOLUTION INTRAMUSCULAR; INTRAVENOUS
Status: DISCONTINUED | OUTPATIENT
Start: 2020-01-06 | End: 2020-01-06 | Stop reason: HOSPADM

## 2020-01-06 RX ORDER — DEXTROMETHORPHAN/PSEUDOEPHED 2.5-7.5/.8
1.2 DROPS ORAL
Status: DISCONTINUED | OUTPATIENT
Start: 2020-01-06 | End: 2020-01-06 | Stop reason: HOSPADM

## 2020-01-06 RX ORDER — EPINEPHRINE 0.1 MG/ML
1 INJECTION INTRACARDIAC; INTRAVENOUS
Status: DISCONTINUED | OUTPATIENT
Start: 2020-01-06 | End: 2020-01-06 | Stop reason: HOSPADM

## 2020-01-06 RX ORDER — PROPOFOL 10 MG/ML
INJECTION, EMULSION INTRAVENOUS AS NEEDED
Status: DISCONTINUED | OUTPATIENT
Start: 2020-01-06 | End: 2020-01-06 | Stop reason: HOSPADM

## 2020-01-06 RX ADMIN — PROPOFOL 50 MG: 10 INJECTION, EMULSION INTRAVENOUS at 09:11

## 2020-01-06 RX ADMIN — PROPOFOL 50 MG: 10 INJECTION, EMULSION INTRAVENOUS at 08:55

## 2020-01-06 RX ADMIN — PROPOFOL 30 MG: 10 INJECTION, EMULSION INTRAVENOUS at 09:14

## 2020-01-06 RX ADMIN — PROPOFOL 50 MG: 10 INJECTION, EMULSION INTRAVENOUS at 08:59

## 2020-01-06 RX ADMIN — PROPOFOL 50 MG: 10 INJECTION, EMULSION INTRAVENOUS at 08:50

## 2020-01-06 RX ADMIN — PROPOFOL 50 MG: 10 INJECTION, EMULSION INTRAVENOUS at 09:08

## 2020-01-06 RX ADMIN — SODIUM CHLORIDE: 900 INJECTION, SOLUTION INTRAVENOUS at 08:40

## 2020-01-06 RX ADMIN — LIDOCAINE HYDROCHLORIDE 20 MG: 20 INJECTION, SOLUTION EPIDURAL; INFILTRATION; INTRACAUDAL; PERINEURAL at 08:47

## 2020-01-06 RX ADMIN — PROPOFOL 50 MG: 10 INJECTION, EMULSION INTRAVENOUS at 08:52

## 2020-01-06 RX ADMIN — PROPOFOL 50 MG: 10 INJECTION, EMULSION INTRAVENOUS at 08:49

## 2020-01-06 RX ADMIN — PROPOFOL 50 MG: 10 INJECTION, EMULSION INTRAVENOUS at 09:05

## 2020-01-06 RX ADMIN — SODIUM CHLORIDE: 900 INJECTION, SOLUTION INTRAVENOUS at 09:18

## 2020-01-06 RX ADMIN — PROPOFOL 20 MG: 10 INJECTION, EMULSION INTRAVENOUS at 08:56

## 2020-01-06 RX ADMIN — PROPOFOL 50 MG: 10 INJECTION, EMULSION INTRAVENOUS at 08:54

## 2020-01-06 RX ADMIN — PROPOFOL 50 MG: 10 INJECTION, EMULSION INTRAVENOUS at 09:02

## 2020-01-06 NOTE — PERIOP NOTES

## 2020-01-06 NOTE — ANESTHESIA POSTPROCEDURE EVALUATION
Post-Anesthesia Evaluation and Assessment    Patient: Dolores Eason MRN: [id number]  SSN: [id number]    YOB: 1965  Age: 62 y.o. Sex: female      I have evaluated the patient and they are stable and ready for discharge from the PACU. Cardiovascular Function/Vital Signs  Visit Vitals  /61   Pulse 82   Temp 36.5 °C (97.7 °F)   Resp 12   SpO2 96%   Breastfeeding No       Patient is status post MAC anesthesia for Procedure(s):  COLONOSCOPY  ENDOSCOPIC POLYPECTOMY  RESOLUTION CLIP. Nausea/Vomiting: None    Postoperative hydration reviewed and adequate. Pain:  Pain Scale 1: Numeric (0 - 10) (01/06/20 5678)  Pain Intensity 1: 0 (01/06/20 7356)   Managed    Neurological Status: At baseline    Mental Status, Level of Consciousness: Alert and  oriented to person, place, and time    Pulmonary Status:   O2 Device: Room air (01/06/20 0927)   Adequate oxygenation and airway patent    Complications related to anesthesia: None    Post-anesthesia assessment completed. No concerns    Signed By: Mariela Solano MD     January 6, 2020              Procedure(s):  COLONOSCOPY  ENDOSCOPIC POLYPECTOMY  RESOLUTION CLIP. MAC    <BSHSIANPOST>    Vitals Value Taken Time   /69 1/6/2020  9:38 AM   Temp 36.5 °C (97.7 °F) 1/6/2020  9:38 AM   Pulse 81 1/6/2020  9:41 AM   Resp 0 1/6/2020  9:41 AM   SpO2 0 % 1/6/2020  9:41 AM   Vitals shown include unvalidated device data.

## 2020-01-06 NOTE — ROUTINE PROCESS
Bala Grace  1965  [id number]    Situation:  Verbal report received from: Tien Persaud RN  Procedure: Procedure(s):  COLONOSCOPY  ENDOSCOPIC POLYPECTOMY  RESOLUTION CLIP    Background:    Preoperative diagnosis: SCREENING  Postoperative diagnosis: 1)transverse colon polyp  2)sigmoid colon polyp x3, 1 clip    :  Dr. Nick Brooks  Assistant(s): Endoscopy RN-1: Koko Carmona RN  Endoscopy RN-2: Ajit Valencia RN; Antonio AYALA    Specimens:   ID Type Source Tests Collected by Time Destination   1 : transverse colon polyp Preservative Colon, Transverse  Shannan Gómez MD 1/6/2020 6230 Pathology   2 : sigmoid colon polyp Preservative Sigmoid  Shannan Gómez MD 1/6/2020 6983 Pathology   3 : large sigmoid colon polyp Preservative Sigmoid  Shannan Gómez MD 1/6/2020 3279 Pathology     H. Pylori  no    Assessment:  Intra-procedure medications   Anesthesia gave intra-procedure sedation and medications, see anesthesia flow sheet yes    Intravenous fluids: NS@ KVO     Vital signs stable     Abdominal assessment: round and soft     Recommendation:  Discharge patient per MD order.     Family or Friend   Permission to share finding with family or friend yes

## 2020-01-06 NOTE — H&P
[address]      History and Physical       NAME:  Bala Grace   :   1965   MRN:   [id number]             History of Present Illness:  Patient is a 62 y.o. who is seen for screening colonoscopy . PMH:  Past Medical History:   Diagnosis Date    Arthritis     Bipolar 1 disorder ([address] 2012    Cervical spine fracture ([address]     pt states she has never had a neck fx    Cervical spine fracture ([address]     pt states she has never had this    Cyst of breast, right, solitary 2015    mammogram from 2015, Sue Patient found to have complex cyst in right breast measuring 8mm.   Biopsy planned for 2015     Depression     psychotic    Diverticulosis 14    no polyps    Endocrine disease     hypothyoidism    Family history of colon cancer     Fibroids 10/14/2009    Fibrosis of uterus     fibroids per pt    Lumbar vertebral fracture (HCC)     Morbid obesity ([address]     Other and unspecified symptoms and signs involving general sensations and perceptions     syncope, BRAIN TRAUMA FROM FALL SEVEN YEARS AGO    Other ill-defined conditions(799.89)     uterine fibroids    Patella fracture     rght    Psychiatric disorder     depression, bipolar    Psychotic depressive reaction ([address] 10/14/2009    Syncope     TBI (traumatic brain injury) ([address]     Thyroid disease     hypothyroidism w functioning thyroid nodule       PSH:  Past Surgical History:   Procedure Laterality Date    ABDOMEN SURGERY PROC UNLISTED      ABLATION - pt states she has not had this    HX GYN      ABLATION - pt states she has had this    HX HEENT      RECONSTRUCTION OF JAW - once per pt on 1/3/2020    HX HIP REPLACEMENT Left 2016    HX ORTHOPAEDIC      patella fracture RT    RECONSTR JAW,FULL,ENDO IMPLNT         Allergies:  No Known Allergies    Home Medications:  Prior to Admission Medications   Prescriptions Last Dose Informant Patient Reported? Taking? cholecalciferol (VITAMIN D3) 1,000 unit tablet 1/5/2020 at Unknown time  No Yes   Sig: Take 1 Tab by mouth daily. cloZAPine (CLOZARIL) 100 mg tablet 1/5/2020 at Unknown time  Yes Yes   Sig: Take 300 mg by mouth nightly. diphenhydramine HCl (BENADRYL PO) 1/5/2020 at Unknown time  Yes Yes   Sig: Take  by mouth. Takes 1.5 tab po as needed. docusate sodium (COLACE) 100 mg capsule 1/5/2020 at Unknown time  No Yes   Sig: Take 1 Cap by mouth two (2) times a day. lamoTRIgine (LAMICTAL) 150 mg tablet 1/5/2020 at Unknown time  Yes Yes   Sig: Take 150 mg by mouth nightly. levothyroxine (SYNTHROID) 88 mcg tablet 1/5/2020 at Unknown time  No Yes   Sig: Take 1 Tab by mouth Daily (before breakfast). lithium carbonate (LITHOBID) 600 mg capsule 1/5/2020 at Unknown time  Yes Yes   Sig: Take 600 mg by mouth nightly. oxybutynin (DITROPAN) 5 mg tablet 1/5/2020 at Unknown time  No Yes   Sig: Take 1 Tab by mouth daily. polyethylene glycol (MIRALAX) 17 gram/dose powder 1/5/2020 at Unknown time  No Yes   Sig: Take 17 g by mouth two (2) times a day.  Hold for lose stool      Facility-Administered Medications: None       Hospital Medications:  Current Facility-Administered Medications   Medication Dose Route Frequency    0.9% sodium chloride infusion  50 mL/hr IntraVENous CONTINUOUS    sodium chloride (NS) flush 5-40 mL  5-40 mL IntraVENous Q8H    sodium chloride (NS) flush 5-40 mL  5-40 mL IntraVENous PRN    midazolam (VERSED) injection 0.25-5 mg  0.25-5 mg IntraVENous Multiple    fentaNYL citrate (PF) injection  mcg   mcg IntraVENous Multiple    naloxone (NARCAN) injection 0.4 mg  0.4 mg IntraVENous Multiple    flumazenil (ROMAZICON) 0.1 mg/mL injection 0.2 mg  0.2 mg IntraVENous Multiple    simethicone (MYLICON) 62QO/7.8PB oral drops 80 mg  1.2 mL Oral Multiple    atropine injection 0.5 mg  0.5 mg IntraVENous ONCE PRN    EPINEPHrine (ADRENALIN) 0.1 mg/mL syringe 1 mg  1 mg Endoscopically ONCE PRN     Facility-Administered Medications Ordered in Other Encounters   Medication Dose Route Frequency    0.9% sodium chloride infusion   IntraVENous CONTINUOUS       Social History:  Social History     Tobacco Use    Smoking status: Never Smoker    Smokeless tobacco: Never Used   Substance Use Topics    Alcohol use: No     Alcohol/week: 0.0 standard drinks       Family History:  Family History   Problem Relation Age of Onset    Asthma Mother     Asthma Father     Alzheimer Father     No Known Problems Brother              Review of Systems:      Constitutional: negative fever, negative chills, negative weight loss  Eyes:   negative visual changes  ENT:   negative sore throat, tongue or lip swelling  Respiratory:  negative cough, negative dyspnea  Cards:  negative for chest pain, palpitations, lower extremity edema  GI:   See HPI  :  negative for frequency, dysuria  Integument:  negative for rash and pruritus  Heme:  negative for easy bruising and gum/nose bleeding  Musculoskel: negative for myalgias,  back pain and muscle weakness  Neuro: negative for headaches, dizziness, vertigo  Psych:  negative for feelings of anxiety, depression       Objective:     Patient Vitals for the past 8 hrs:   BP Temp Pulse Resp SpO2   01/06/20 0829 128/74 97.8 °F (36.6 °C) 86 14 98 %     No intake/output data recorded. No intake/output data recorded. EXAM:     NEURO-a&o   HEENT-wnl   LUNGS-clear    COR-regular rate and rhythym     ABD-soft , no tenderness, no rebound, good bs     EXT-no edema     Data Review     No results for input(s): WBC, HGB, HCT, PLT, HGBEXT, HCTEXT, PLTEXT in the last 72 hours. No results for input(s): NA, K, CL, CO2, BUN, CREA, GLU, PHOS, CA in the last 72 hours. No results for input(s): SGOT, GPT, AP, TBIL, TP, ALB, GLOB, GGT, AML, LPSE in the last 72 hours.     No lab exists for component: AMYP, HLPSE  No results for input(s): INR, PTP, APTT, INREXT in the last 72 hours.       Assessment:   · Screening colonoscopy      Patient Active Problem List   Diagnosis Code    Hypothyroidism E03.9    Sleep disorder G47.9    Bipolar 1 disorder ([facility] F31.9    Vitamin D deficiency E55.9    OA (osteoarthritis) of knee M17.10    Constipation K59.00    Morbid obesity ([facility] E66.01    Elevated hemoglobin A1c R73.09    Degenerative joint disease (DJD) of hip M16.9    History of traumatic brain injury Z87.820    Family history of colon cancer Z80.0    Depression F32.9    Cyst of left breast N60.02         Plan:   · Endoscopic procedure with sedation     Signed By: Miriam Carvajal MD     1/6/2020  8:43 AM

## 2020-01-06 NOTE — DISCHARGE INSTRUCTIONS
[address], [address] [place] [address] Regina Young  [id number]  1965    Discomfort:  Redness at IV site- apply warm compress to area; if redness or soreness persist- contact your physician  There may be a slight amount of blood passed from the rectum  Gaseous discomfort- walking, belching will help relieve any discomfort  You may not operate a vehicle for 12 hours  You may not engage in an occupation involving machinery or appliances for rest of today  You may not drink alcoholic beverages for at least 12 hours  Avoid making any critical decisions for at least 24 hour  DIET:  You may resume your regular diet - however -  remember your colon is empty and a heavy meal will produce gas. Avoid these foods:  vegetables, fried / greasy foods, carbonated drinks     ACTIVITY:  You may  resume your normal daily activities it is recommended that you spend the remainder of the day resting -  avoid any strenuous activity. CALL M.D. ANY SIGN OF:   Increasing pain, nausea, vomiting  Abdominal distension (swelling)  New increased bleeding (oral or rectal)  Fever (chills)  Pain in chest area  Bloody discharge from nose or mouth  Shortness of breath      Follow-up Instructions:   Call Dr. Erica Flowers for any questions or problems at [address] [place] [address]:   Your colonoscopy showed 4 polyps removed, rest of exam was normal.  Telephone # [phone]      Signed By: Erica Flowers MD     1/6/2020  9:29 AM       DISCHARGE SUMMARY from Nurse    The following personal items collected during your admission are returned to you:   Dental Appliance:    Vision: Visual Aid: None  Hearing Aid:    Jewelry:    Clothing:    Other Valuables:    Valuables sent to safe:      Patient Education        Colon Polyps: Care Instructions  Your Care Instructions    Colon polyps are growths in the colon or the rectum.  The cause of most colon polyps is not known, and most people who get them do not have any problems. But a certain kind can turn into cancer. For this reason, regular testing for colon polyps is important for people as they get older. It is also important for anyone who has an increased risk for colon cancer. Polyps are usually found through routine colon cancer screening tests. Although most colon polyps are not cancerous, they are usually removed and then tested for cancer. Screening for colon cancer saves lives because the cancer can usually be cured if it is caught early. If you have a polyp that is the type that can turn into cancer, you may need more tests to examine your entire colon. The doctor will remove any other polyps that he or she finds, and you will be tested more often. Follow-up care is a key part of your treatment and safety. Be sure to make and go to all appointments, and call your doctor if you are having problems. It's also a good idea to know your test results and keep a list of the medicines you take. How can you care for yourself at home? Regular exams to look for colon polyps are the best way to prevent polyps from turning into colon cancer. These can include stool tests, sigmoidoscopy, colonoscopy, and CT colonography. Talk with your doctor about a testing schedule that is right for you. To prevent polyps  There is no home treatment that can prevent colon polyps. But these steps may help lower your risk for cancer. · Stay active. Being active can help you get to and stay at a healthy weight. Try to exercise on most days of the week. Walking is a good choice. · Eat well. Choose a variety of vegetables, fruits, legumes (such as peas and beans), fish, poultry, and whole grains. · Do not smoke. If you need help quitting, talk to your doctor about stop-smoking programs and medicines. These can increase your chances of quitting for good. · If you drink alcohol, limit how much you drink.  Limit alcohol to 2 drinks a day for men and 1 drink a day for women. When should you call for help? Call your doctor now or seek immediate medical care if:    · You have severe belly pain.     · Your stools are maroon or very bloody.    Watch closely for changes in your health, and be sure to contact your doctor if:    · You have a fever.     · You have nausea or vomiting.     · You have a change in bowel habits (new constipation or diarrhea).     · Your symptoms get worse or are not improving as expected. Where can you learn more? Go to http://juan-maryellen.info/. Enter 95 [id number] in the search box to learn more about \"Colon Polyps: Care Instructions. \"  Current as of: December 19, 2018  Content Version: 12.2  © 3317-0447 [employer]. Care instructions adapted under license by [employer] (which disclaims liability or warranty for this information). If you have questions about a medical condition or this instruction, always ask your healthcare professional. [place] [address] any warranty or liability for your use of this information.

## 2020-01-06 NOTE — PROCEDURES
[address]  [address] [facility], [address] [place] [address]      Colonoscopy Operative Report    Ariane Up  [id number]  1965      Procedure Type:   Colonoscopy with polypectomy (snare cautery)     Indications:    Family history of coloretal cancer (screening only)   Date of last colonoscopy: 5 years ago, Polyps  No    Pre-operative Diagnosis: see indication above    Post-operative Diagnosis:  See findings below    :  Rhona Ellis MD    Surgical Assistant: None    Implants:  None    Referring Provider: Priya León MD      Sedation:  MAC anesthesia Propofol      Procedure Details:  After informed consent was obtained with all risks and benefits of procedure explained and preoperative exam completed, the patient was taken to the endoscopy suite and placed in the left lateral decubitus position. Upon sequential sedation as per above, a digital rectal exam was performed demonstrating internal hemorrhoids. The Olympus videocolonoscope  was inserted in the rectum and carefully advanced to the cecum, which was identified by the ileocecal valve and appendiceal orifice. The cecum was identified by the ileocecal valve and appendiceal orifice. The quality of preparation was good. The colonoscope was slowly withdrawn with careful evaluation between folds. Retroflexion in the rectum was completed . Findings:   Rectum: normal  Sigmoid: normal  2 polyps around 7 mm each removed by hot biopsies    3 cm size pedunculated polyp removed by hot snaring, one hemoclip was deployed and closed the site to prevent bleeding, that polyp was located at 25 cm from anus  Descending Colon: normal  Transverse Colon: 9 mm polyp removed by hot biopsy  Ascending Colon: normal  Cecum: normal        Specimen Removed:  as above    Complications: None. EBL:  None. Impression:    see findings    Recommendations: --Repeat colonoscopy in 3 years.           Signed By: Rhona Ellis MD 1/6/2020  9:24 AM

## 2020-05-28 ENCOUNTER — DOCUMENTATION ONLY (OUTPATIENT)
Dept: INTERNAL MEDICINE CLINIC | Age: 55
End: 2020-05-28

## 2020-05-28 ENCOUNTER — VIRTUAL VISIT (OUTPATIENT)
Dept: INTERNAL MEDICINE CLINIC | Age: 55
End: 2020-05-28

## 2020-05-28 DIAGNOSIS — F31.9 BIPOLAR 1 DISORDER (HCC): ICD-10-CM

## 2020-05-28 DIAGNOSIS — N39.3 STRESS INCONTINENCE IN FEMALE: ICD-10-CM

## 2020-05-28 DIAGNOSIS — E66.01 MORBID OBESITY (HCC): ICD-10-CM

## 2020-05-28 DIAGNOSIS — N39.3 STRESS INCONTINENCE: ICD-10-CM

## 2020-05-28 DIAGNOSIS — R73.09 ELEVATED HEMOGLOBIN A1C: ICD-10-CM

## 2020-05-28 DIAGNOSIS — E03.9 HYPOTHYROIDISM, UNSPECIFIED TYPE: Primary | ICD-10-CM

## 2020-05-28 RX ORDER — OXYBUTYNIN CHLORIDE 5 MG/1
5 TABLET ORAL DAILY
Qty: 30 TAB | Refills: 11 | Status: SHIPPED | OUTPATIENT
Start: 2020-05-28 | End: 2021-06-17 | Stop reason: SDUPTHER

## 2020-05-28 NOTE — PATIENT INSTRUCTIONS
I am glad we were able to meet earlier today. Please call and schedule your follow-up appointment soon so you are able to get the date and time you need. I recommend:  
 - Please do not hesitate to contact the office if any new questions or concerns. All the best,  
  
   Dr. Emeka Weiner

## 2020-05-28 NOTE — PROGRESS NOTES
Medical records request sent to [address] [place] [address] for women near [facility].

## 2020-05-28 NOTE — PROGRESS NOTES
Marilee Penny is a 60 y.o. female evaluated via audio only technology on 5/28/2020. Consent: She and/or her health care decision maker is aware that she may receive a bill for this audio only encounter, depending on her insurance coverage, and has provided verbal consent to proceed: Yes    I communicated with the patient and/or health care decision maker about the nature and details of the following:  Assessment & Plan:   Diagnoses and all orders for this visit:    1. Hypothyroidism, unspecified type  -     TSH 3RD GENERATION  -     CBC W/O DIFF    2. Elevated hemoglobin A1c  -     HEMOGLOBIN A1C WITH EAG  -     METABOLIC PANEL, COMPREHENSIVE  -     CBC W/O DIFF    3. Stress incontinence in female    4. Stress incontinence  -     oxybutynin (DITROPAN) 5 mg tablet; Take 1 Tab by mouth daily.  -     CBC W/O DIFF    5. Bipolar 1 disorder (HCC)  -     LITHIUM  -     CBC W/O DIFF    6. Morbid obesity ([facility]  -     METABOLIC PANEL, COMPREHENSIVE  -     CBC W/O DIFF    Hypothyroid:  Well controlled per last labs, no change in weight. Continue current medications    Elevated A1C: continue health diet and encouarge exercise. mointoring lab orders placed    HM:    - pap - done with [facility] - possibly Adrianne patiño in the last 12 months. Will send record request.  12  Subjective:   Marilee Penny is a 60 y.o. female who was seen for Follow-up (thyroid )    Patient reports she is in her group home. Following with psychiatrist and getting labs done every 3 months. No new concerns. Prior to Admission medications    Medication Sig Start Date End Date Taking? Authorizing Provider   diphenhydramine HCl (BENADRYL PO) Take  by mouth. Takes 1.5 tab po as needed. Yes Provider, Historical   levothyroxine (SYNTHROID) 88 mcg tablet Take 1 Tab by mouth Daily (before breakfast). 7/23/19  Yes Ladi Fowler MD   polyethylene glycol [facility]) 17 gram/dose powder Take 17 g by mouth two (2) times a day.  Hold for lose stool 4/24/19  Yes Alta Bruner MD   oxybutynin (DITROPAN) 5 mg tablet Take 1 Tab by mouth daily. 4/24/19  Yes Alta Bruner MD   docusate sodium (COLACE) 100 mg capsule Take 1 Cap by mouth two (2) times a day. 4/24/19  Yes Alta Bruner MD   cholecalciferol (VITAMIN D3) 1,000 unit tablet Take 1 Tab by mouth daily. 4/24/19  Yes Alta Bruner MD   cloZAPine (CLOZARIL) 100 mg tablet Take 300 mg by mouth nightly. 12/16/16  Yes Alta Bruner MD   lamoTRIgine (LAMICTAL) 150 mg tablet Take 150 mg by mouth nightly. Yes Marisol Sandy NP   lithium carbonate (LITHOBID) 600 mg capsule Take 600 mg by mouth nightly. Yes Marisol Sandy NP     No Known Allergies    Social History     Tobacco Use    Smoking status: Never Smoker    Smokeless tobacco: Never Used   Substance Use Topics    Alcohol use: No     Alcohol/week: 0.0 standard drinks       Review of Systems   Constitutional: Negative for chills and fever. HENT: Negative for congestion and sore throat. Eyes: Negative for pain. Respiratory: Negative for cough and shortness of breath. Cardiovascular: Negative for chest pain and palpitations. Gastrointestinal: Negative for abdominal pain, constipation, diarrhea, nausea and vomiting. Skin: Negative for itching and rash. Neurological: Negative for dizziness and headaches. Psychiatric/Behavioral: The patient does not have insomnia. I affirm this is a Patient-Initiated Episode with a Patient who has not had a related appointment within my department in the past 7 days or scheduled within the next 24 hours.     Total Time: minutes: 11-20 minutes 11 minutes and 50 seconds    Note: not billable if this call serves to triage the patient into an appointment for the relevant concern      Alphonsa Hatchet, MD

## 2020-05-28 NOTE — PROGRESS NOTES
Chief Complaint   Patient presents with    Follow-up     thyroid      1. Have you been to the ER, urgent care clinic since your last visit? Hospitalized since your last visit? [facility]     2. Have you seen or consulted any other health care providers outside of the [address] [place] [address] since your last visit? Include any pap smears or colon screening.  No    Health Maintenance Due   Topic Date Due    Shingrix Vaccine Age 49> (1 of 2) 01/18/2013    PAP AKA CERVICAL CYTOLOGY  10/20/2017       Learning Assessment 10/20/2014   PRIMARY LEARNER Healthcare POA   PRIMARY LANGUAGE ENGLISH   LEARNER PREFERENCE PRIMARY LISTENING     READING   ANSWERED BY Mother   RELATIONSHIP GRANDPARENT

## 2020-05-28 NOTE — PROGRESS NOTES
Please request record of most recent visit/pap result to [place] [facility]. Patient isn't 100% sure if that was the location but it was at [facility].  She thinks she may have seen Dr. Esther Gaspar MD

## 2020-06-10 LAB
ALBUMIN SERPL-MCNC: 4.6 G/DL (ref 3.8–4.9)
ALBUMIN/GLOB SERPL: 2.3 {RATIO} (ref 1.2–2.2)
ALP SERPL-CCNC: 123 IU/L (ref 39–117)
ALT SERPL-CCNC: 32 IU/L (ref 0–32)
AST SERPL-CCNC: 24 IU/L (ref 0–40)
BILIRUB SERPL-MCNC: 0.4 MG/DL (ref 0–1.2)
BUN SERPL-MCNC: 12 MG/DL (ref 6–24)
BUN/CREAT SERPL: 14 (ref 9–23)
CALCIUM SERPL-MCNC: 9.8 MG/DL (ref 8.7–10.2)
CHLORIDE SERPL-SCNC: 107 MMOL/L (ref 96–106)
CO2 SERPL-SCNC: 24 MMOL/L (ref 20–29)
CREAT SERPL-MCNC: 0.83 MG/DL (ref 0.57–1)
ERYTHROCYTE [DISTWIDTH] IN BLOOD BY AUTOMATED COUNT: 14.3 % (ref 11.7–15.4)
EST. AVERAGE GLUCOSE BLD GHB EST-MCNC: 117 MG/DL
GLOBULIN SER CALC-MCNC: 2 G/DL (ref 1.5–4.5)
GLUCOSE SERPL-MCNC: 112 MG/DL (ref 65–99)
HBA1C MFR BLD: 5.7 % (ref 4.8–5.6)
HCT VFR BLD AUTO: 42.6 % (ref 34–46.6)
HGB BLD-MCNC: 13.3 G/DL (ref 11.1–15.9)
LITHIUM SERPL-SCNC: 0.5 MMOL/L (ref 0.6–1.2)
MCH RBC QN AUTO: 28.6 PG (ref 26.6–33)
MCHC RBC AUTO-ENTMCNC: 31.2 G/DL (ref 31.5–35.7)
MCV RBC AUTO: 92 FL (ref 79–97)
PLATELET # BLD AUTO: 186 X10E3/UL (ref 150–450)
POTASSIUM SERPL-SCNC: 4.5 MMOL/L (ref 3.5–5.2)
PROT SERPL-MCNC: 6.6 G/DL (ref 6–8.5)
RBC # BLD AUTO: 4.65 X10E6/UL (ref 3.77–5.28)
SODIUM SERPL-SCNC: 145 MMOL/L (ref 134–144)
TSH SERPL DL<=0.005 MIU/L-ACNC: 3.79 UIU/ML (ref 0.45–4.5)
WBC # BLD AUTO: 9.8 X10E3/UL (ref 3.4–10.8)

## 2020-06-10 NOTE — PROGRESS NOTES
Thyroid hormone in goal range. A1C continues to remain just above normal range indicating blood sugars are near normal. No current sign of progression to diabetes. Lithium level not too high. Stable electrolytes, stable kidney function. Blood counts stable, no anemia    These are good labs, no indication for change in medication at this time.

## 2020-07-14 DIAGNOSIS — E03.9 HYPOTHYROIDISM, UNSPECIFIED TYPE: ICD-10-CM

## 2020-07-14 DIAGNOSIS — K59.00 CONSTIPATION, UNSPECIFIED CONSTIPATION TYPE: ICD-10-CM

## 2020-07-14 DIAGNOSIS — E55.9 VITAMIN D DEFICIENCY: ICD-10-CM

## 2020-07-14 RX ORDER — LEVOTHYROXINE SODIUM 88 UG/1
88 TABLET ORAL
Qty: 90 TAB | Refills: 1 | Status: SHIPPED | OUTPATIENT
Start: 2020-07-14 | End: 2021-06-17 | Stop reason: SDUPTHER

## 2020-07-14 RX ORDER — MELATONIN
1000 DAILY
Qty: 90 TAB | Refills: 1 | Status: SHIPPED | OUTPATIENT
Start: 2020-07-14 | End: 2021-06-17 | Stop reason: SDUPTHER

## 2020-07-14 RX ORDER — DOCUSATE SODIUM 100 MG/1
100 CAPSULE, LIQUID FILLED ORAL 2 TIMES DAILY
Qty: 180 CAP | Refills: 1 | Status: SHIPPED | OUTPATIENT
Start: 2020-07-14 | End: 2021-06-17 | Stop reason: SDUPTHER

## 2020-07-14 NOTE — TELEPHONE ENCOUNTER
Last visit 05/28/2020 Virtual visit MD Shaye Joseph   Next appointment 11/24/2020 MD Shaye Joseph   Previous refill encounter(s)  04/24/2019 Docusate #60 with 11 refills,  04/24/2019 Vitamin D3 #30 with 11 refills,   07/23/2019 Synthroid #10     Requested Prescriptions     Pending Prescriptions Disp Refills    cholecalciferol (Vitamin D3) (1000 Units /25 mcg) tablet 90 Tab 1     Sig: Take 1 Tab by mouth daily.  levothyroxine (SYNTHROID) 88 mcg tablet 90 Tab 1     Sig: Take 1 Tab by mouth Daily (before breakfast).  docusate sodium (Colace) 100 mg capsule 180 Cap 1     Sig: Take 1 Cap by mouth two (2) times a day.

## 2020-10-17 ENCOUNTER — PATIENT MESSAGE (OUTPATIENT)
Dept: INTERNAL MEDICINE CLINIC | Age: 55
End: 2020-10-17

## 2021-06-17 ENCOUNTER — OFFICE VISIT (OUTPATIENT)
Dept: INTERNAL MEDICINE CLINIC | Age: 56
End: 2021-06-17
Payer: MEDICARE

## 2021-06-17 VITALS
SYSTOLIC BLOOD PRESSURE: 134 MMHG | WEIGHT: 226.6 LBS | BODY MASS INDEX: 42.78 KG/M2 | TEMPERATURE: 98.2 F | OXYGEN SATURATION: 90 % | HEART RATE: 97 BPM | DIASTOLIC BLOOD PRESSURE: 78 MMHG | HEIGHT: 61 IN

## 2021-06-17 DIAGNOSIS — M17.11 PRIMARY OSTEOARTHRITIS OF RIGHT KNEE: ICD-10-CM

## 2021-06-17 DIAGNOSIS — F31.9 BIPOLAR 1 DISORDER (HCC): ICD-10-CM

## 2021-06-17 DIAGNOSIS — Z80.0 FAMILY HISTORY OF COLON CANCER: ICD-10-CM

## 2021-06-17 DIAGNOSIS — Z00.00 MEDICARE ANNUAL WELLNESS VISIT, SUBSEQUENT: Primary | ICD-10-CM

## 2021-06-17 DIAGNOSIS — E03.9 HYPOTHYROIDISM, UNSPECIFIED TYPE: ICD-10-CM

## 2021-06-17 DIAGNOSIS — R73.09 ELEVATED HEMOGLOBIN A1C: ICD-10-CM

## 2021-06-17 DIAGNOSIS — N60.02 CYST OF LEFT BREAST: ICD-10-CM

## 2021-06-17 DIAGNOSIS — K59.00 CONSTIPATION, UNSPECIFIED CONSTIPATION TYPE: ICD-10-CM

## 2021-06-17 DIAGNOSIS — R73.9 ELEVATED BLOOD SUGAR: ICD-10-CM

## 2021-06-17 DIAGNOSIS — R41.89 COGNITIVE IMPAIRMENT: ICD-10-CM

## 2021-06-17 DIAGNOSIS — E55.9 VITAMIN D DEFICIENCY: ICD-10-CM

## 2021-06-17 DIAGNOSIS — Z87.820 HISTORY OF TRAUMATIC BRAIN INJURY: ICD-10-CM

## 2021-06-17 DIAGNOSIS — E66.01 MORBID OBESITY (HCC): ICD-10-CM

## 2021-06-17 DIAGNOSIS — N39.3 STRESS INCONTINENCE: ICD-10-CM

## 2021-06-17 LAB
ALBUMIN SERPL-MCNC: 4 G/DL (ref 3.5–5)
ALBUMIN/GLOB SERPL: 1.2 {RATIO} (ref 1.1–2.2)
ALP SERPL-CCNC: 141 U/L (ref 45–117)
ALT SERPL-CCNC: 52 U/L (ref 12–78)
ANION GAP SERPL CALC-SCNC: 2 MMOL/L (ref 5–15)
AST SERPL-CCNC: 19 U/L (ref 15–37)
BILIRUB SERPL-MCNC: 0.4 MG/DL (ref 0.2–1)
BUN SERPL-MCNC: 14 MG/DL (ref 6–20)
BUN/CREAT SERPL: 16 (ref 12–20)
CALCIUM SERPL-MCNC: 9.8 MG/DL (ref 8.5–10.1)
CHLORIDE SERPL-SCNC: 111 MMOL/L (ref 97–108)
CO2 SERPL-SCNC: 29 MMOL/L (ref 21–32)
CREAT SERPL-MCNC: 0.87 MG/DL (ref 0.55–1.02)
ERYTHROCYTE [DISTWIDTH] IN BLOOD BY AUTOMATED COUNT: 14.6 % (ref 11.5–14.5)
EST. AVERAGE GLUCOSE BLD GHB EST-MCNC: 108 MG/DL
GLOBULIN SER CALC-MCNC: 3.3 G/DL (ref 2–4)
GLUCOSE SERPL-MCNC: 103 MG/DL (ref 65–100)
HBA1C MFR BLD: 5.4 % (ref 4–5.6)
HCT VFR BLD AUTO: 43.4 % (ref 35–47)
HGB BLD-MCNC: 13.2 G/DL (ref 11.5–16)
MCH RBC QN AUTO: 28.9 PG (ref 26–34)
MCHC RBC AUTO-ENTMCNC: 30.4 G/DL (ref 30–36.5)
MCV RBC AUTO: 95 FL (ref 80–99)
NRBC # BLD: 0 K/UL (ref 0–0.01)
NRBC BLD-RTO: 0 PER 100 WBC
PLATELET # BLD AUTO: 184 K/UL (ref 150–400)
PMV BLD AUTO: 11.3 FL (ref 8.9–12.9)
POTASSIUM SERPL-SCNC: 4.2 MMOL/L (ref 3.5–5.1)
PROT SERPL-MCNC: 7.3 G/DL (ref 6.4–8.2)
RBC # BLD AUTO: 4.57 M/UL (ref 3.8–5.2)
SODIUM SERPL-SCNC: 142 MMOL/L (ref 136–145)
TSH SERPL DL<=0.05 MIU/L-ACNC: 1.41 UIU/ML (ref 0.36–3.74)
WBC # BLD AUTO: 8.7 K/UL (ref 3.6–11)

## 2021-06-17 PROCEDURE — G0463 HOSPITAL OUTPT CLINIC VISIT: HCPCS | Performed by: INTERNAL MEDICINE

## 2021-06-17 PROCEDURE — 3017F COLORECTAL CA SCREEN DOC REV: CPT | Performed by: INTERNAL MEDICINE

## 2021-06-17 PROCEDURE — G8427 DOCREV CUR MEDS BY ELIG CLIN: HCPCS | Performed by: INTERNAL MEDICINE

## 2021-06-17 PROCEDURE — G9899 SCRN MAM PERF RSLTS DOC: HCPCS | Performed by: INTERNAL MEDICINE

## 2021-06-17 PROCEDURE — G8417 CALC BMI ABV UP PARAM F/U: HCPCS | Performed by: INTERNAL MEDICINE

## 2021-06-17 PROCEDURE — G0439 PPPS, SUBSEQ VISIT: HCPCS | Performed by: INTERNAL MEDICINE

## 2021-06-17 PROCEDURE — 99213 OFFICE O/P EST LOW 20 MIN: CPT | Performed by: INTERNAL MEDICINE

## 2021-06-17 PROCEDURE — G9717 DOC PT DX DEP/BP F/U NT REQ: HCPCS | Performed by: INTERNAL MEDICINE

## 2021-06-17 RX ORDER — OXYBUTYNIN CHLORIDE 5 MG/1
5 TABLET ORAL DAILY
Qty: 30 TABLET | Refills: 11 | Status: SHIPPED | OUTPATIENT
Start: 2021-06-17 | End: 2022-02-02 | Stop reason: SDUPTHER

## 2021-06-17 RX ORDER — MELATONIN
1000 DAILY
Qty: 90 TABLET | Refills: 1 | Status: SHIPPED | OUTPATIENT
Start: 2021-06-17 | End: 2022-02-02 | Stop reason: SDUPTHER

## 2021-06-17 RX ORDER — LEVOTHYROXINE SODIUM 88 UG/1
88 TABLET ORAL
Qty: 90 TABLET | Refills: 1 | Status: SHIPPED | OUTPATIENT
Start: 2021-06-17 | End: 2022-02-02 | Stop reason: SDUPTHER

## 2021-06-17 RX ORDER — DOCUSATE SODIUM 100 MG/1
100 CAPSULE, LIQUID FILLED ORAL 2 TIMES DAILY
Qty: 180 CAPSULE | Refills: 1 | Status: SHIPPED | OUTPATIENT
Start: 2021-06-17 | End: 2022-02-02 | Stop reason: SDUPTHER

## 2021-06-17 NOTE — PROGRESS NOTES
RM 1    Chief Complaint   Patient presents with    Complete Physical       Visit Vitals  Pulse 97   Temp 98.2 °F (36.8 °C)   Ht 5' 1\" (1.549 m)   Wt 226 lb 9.6 oz (102.8 kg)   SpO2 90%   BMI 42.82 kg/m²       3 most recent PHQ Screens 6/17/2021   Little interest or pleasure in doing things Not at all   Feeling down, depressed, irritable, or hopeless Not at all   Total Score PHQ 2 0         1. Have you been to the ER, urgent care clinic since your last visit? Hospitalized since your last visit? No    2. Have you seen or consulted any other health care providers outside of the [address] [place] [address] since your last visit? Include any pap smears or colon screening. Dr. Ritika Buchanan NP at [facility]    Health Maintenance Due   Topic Date Due    COVID-19 Vaccine (1) Never done    Shingrix Vaccine Age 50> (1 of 2) Never done    PAP AKA CERVICAL CYTOLOGY  10/20/2017    Medicare Yearly Exam  11/27/2020    A1C test (Diabetic or Prediabetic)  06/09/2021       Learning Assessment 10/20/2014   PRIMARY LEARNER Healthcare POA   PRIMARY LANGUAGE ENGLISH   LEARNER PREFERENCE PRIMARY LISTENING     READING   ANSWERED BY Mother   RELATIONSHIP GRANDPARENT         AVS  education, follow up, and recommendations provided and addressed with patient.   services used to advise patient (no)

## 2021-06-17 NOTE — PATIENT INSTRUCTIONS
Well Visit, Women 48 to 72: Care Instructions Overview Well visits can help you stay healthy. Your doctor has checked your overall health and may have suggested ways to take good care of yourself. Your doctor also may have recommended tests. At home, you can help prevent illness with healthy eating, regular exercise, and other steps. Follow-up care is a key part of your treatment and safety. Be sure to make and go to all appointments, and call your doctor if you are having problems. It's also a good idea to know your test results and keep a list of the medicines you take. How can you care for yourself at home? · Get screening tests that you and your doctor decide on. Screening helps find diseases before any symptoms appear. · Eat healthy foods. Choose fruits, vegetables, whole grains, protein, and low-fat dairy foods. Limit fat, especially saturated fat. Reduce salt in your diet. · Limit alcohol. Have no more than 1 drink a day or 7 drinks a week. · Get at least 30 minutes of exercise on most days of the week. Walking is a good choice. You also may want to do other activities, such as running, swimming, cycling, or playing tennis or team sports. · Reach and stay at a healthy weight. This will lower your risk for many problems, such as obesity, diabetes, heart disease, and high blood pressure. · Do not smoke. Smoking can make health problems worse. If you need help quitting, talk to your doctor about stop-smoking programs and medicines. These can increase your chances of quitting for good. · Care for your mental health. It is easy to get weighed down by worry and stress. Learn strategies to manage stress, like deep breathing and mindfulness, and stay connected with your family and community. If you find you often feel sad or hopeless, talk with your doctor. Treatment can help. · Talk to your doctor about whether you have any risk factors for sexually transmitted infections (STIs).  You can help prevent STIs if you wait to have sex with a new partner (or partners) until you've each been tested for STIs. It also helps if you use condoms (male or female condoms) and if you limit your sex partners to one person who only has sex with you. Vaccines are available for some STIs. · If you think you may have a problem with alcohol or drug use, talk to your doctor. This includes prescription medicines (such as amphetamines and opioids) and illegal drugs (such as cocaine and methamphetamine). Your doctor can help you figure out what type of treatment is best for you. · Protect your skin from too much sun. When you're outdoors from 10 a.m. to 4 p.m., stay in the shade or cover up with clothing and a hat with a wide brim. Wear sunglasses that block UV rays. Even when it's cloudy, put broad-spectrum sunscreen (SPF 30 or higher) on any exposed skin. · See a dentist one or two times a year for checkups and to have your teeth cleaned. · Wear a seat belt in the car. When should you call for help? Watch closely for changes in your health, and be sure to contact your doctor if you have any problems or symptoms that concern you. Where can you learn more? Go to http://www.gray.com/ Enter I644 in the search box to learn more about \"Well Visit, Women 50 to 72: Care Instructions. \" Current as of: May 27, 2020               Content Version: 12.8 © 8277-6273 [employer]wise[employer] Incorporated. Care instructions adapted under license by [employer] (which disclaims liability or warranty for this information). If you have questions about a medical condition or this instruction, always ask your healthcare professional. [place] [address] any warranty or liability for your use of this information. Medicare Wellness Visit, Female The best way to live healthy is to have a lifestyle where you eat a well-balanced diet, exercise regularly, limit alcohol use, and quit all forms of tobacco/nicotine, if applicable. Regular preventive services are another way to keep healthy. Preventive services (vaccines, screening tests, monitoring & exams) can help personalize your care plan, which helps you manage your own care. Screening tests can find health problems at the earliest stages, when they are easiest to treat. [facility] follows the current, evidence-based guidelines published by the [facility] Valverde (RUSTST[employer]) when recommending preventive services for our patients. Because we follow these guidelines, sometimes recommendations change over time as research supports it. (For example, mammograms used to be recommended annually. Even though Medicare will still pay for an annual mammogram, the newer guidelines recommend a mammogram every two years for women of average risk). Of course, you and your doctor may decide to screen more often for some diseases, based on your risk and your co-morbidities (chronic disease you are already diagnosed with). Preventive services for you include: - Medicare offers their members a free annual wellness visit, which is time for you and your primary care provider to discuss and plan for your preventive service needs. Take advantage of this benefit every year! 
-All adults over the age of 72 should receive the recommended pneumonia vaccines. Current USPSTF guidelines recommend a series of two vaccines for the best pneumonia protection.  
-All adults should have a flu vaccine yearly and a tetanus vaccine every 10 years.  
-All adults age 48 and older should receive the shingles vaccines (series of two vaccines).      
-All adults age 38-68 who are overweight should have a diabetes screening test once every three years.  
-All adults born between [birth year] and 1967 should be screened once for Hepatitis C. 
-Other screening tests and preventive services for persons with diabetes include: an eye exam to screen for diabetic retinopathy, a kidney function test, a foot exam, and stricter control over your cholesterol.  
-Cardiovascular screening for adults with routine risk involves an electrocardiogram (ECG) at intervals determined by your doctor.  
-Colorectal cancer screenings should be done for adults age 54-65 with no increased risk factors for colorectal cancer. There are a number of acceptable methods of screening for this type of cancer. Each test has its own benefits and drawbacks. Discuss with your doctor what is most appropriate for you during your annual wellness visit. The different tests include: colonoscopy (considered the best screening method), a fecal occult blood test, a fecal DNA test, and sigmoidoscopy. 
 
-A bone mass density test is recommended when a woman turns 65 to screen for osteoporosis. This test is only recommended one time, as a screening. Some providers will use this same test as a disease monitoring tool if you already have osteoporosis. -Breast cancer screenings are recommended every other year for women of normal risk, age 54-69. 
-Cervical cancer screenings for women over age 72 are only recommended with certain risk factors. Here is a list of your current Health Maintenance items (your personalized list of preventive services) with a due date: 
Health Maintenance Due Topic Date Due  
 COVID-19 Vaccine (1) Never done  Shingles Vaccine (1 of 2) Never done  Hemoglobin A1C    06/09/2021

## 2021-06-17 NOTE — PROGRESS NOTES
A/P:  [place] is a 60 y.o. female, she presents today for:    1. Medicare annual wellness visit, subsequent  2. Morbid obesity (HCC)  -     METABOLIC PANEL, COMPREHENSIVE; Future  -     CBC W/O DIFF; Future  3. Bipolar 1 disorder (HCC)  -     METABOLIC PANEL, COMPREHENSIVE; Future  -     CBC W/O DIFF; Future  -     TSH 3RD GENERATION; Future  4. Hypothyroidism, unspecified type  -     TSH 3RD GENERATION; Future  -     levothyroxine (SYNTHROID) 88 mcg tablet; Take 1 Tablet by mouth Daily (before breakfast). , Normal, Disp-90 Tablet, R-1  5. Cyst of left breast  6. Family history of colon cancer  7. Vitamin D deficiency  -     cholecalciferol (Vitamin D3) (1000 Units /25 mcg) tablet; Take 1 Tablet by mouth daily. , Normal, Disp-90 Tablet, R-1  8. Constipation, unspecified constipation type  -     HEMOGLOBIN A1C WITH EAG; Future  -     METABOLIC PANEL, COMPREHENSIVE; Future  -     CBC W/O DIFF; Future  -     TSH 3RD GENERATION; Future  -     docusate sodium (Colace) 100 mg capsule; Take 1 Capsule by mouth two (2) times a day., Normal, Disp-180 Capsule, R-1  9. Stress incontinence  -     oxybutynin (DITROPAN) 5 mg tablet; Take 1 Tablet by mouth daily. , Normal, Disp-30 Tablet, R-11  10. Elevated blood sugar  -     HEMOGLOBIN A1C WITH EAG; Future  11. Primary osteoarthritis of right knee  -     REFERRAL TO PHYSICAL THERAPY  12. 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 History of traumatic brain injury  14. Elevated hemoglobin A1c  -     HEMOGLOBIN A1C WITH EAG; Future     Well woman (non-gyn) exam: history and exam revealed issues as noted below. Cancer screening:    - breast: mammogram - completed with [facility] April 2021 - follow-up ultrasound advised   - cervical: done with [facility] appointment completed last month. - colon: done 1/2020 - repeat in 3 years. Vaccine status: up to date   Cardiovascular risk: BP well controlled, lipid screen requested  Bone health: daily vit D supplement yes and screen for   Diet and Exercise: limited in current setting in group home. Maintaining current weight. Continue to encourage avoidance of sugary beverages. TB screening: no known exposure to TB, no travels outside of country. PAtient has not had any new chronic cough, fever, nor unexpected weight loss.      Hypothyroid:  repeat tsh today. Euthyroid on exam.      Elevated A1C: limited dietary control in group home status and given patient functional mental status. (this morning had toast and fried egg and wayne). Right knee arthritis: secondary to trauma and OA   - future knee replacement currently on hold. - advised to discuss possible benefit of steroid injection   - PT requested for strengthening, also would benefit for balance. Future Appointments   Date Time Provider Mery Brown   12/17/2021  9:00 AM Adrian Weinberg MD [facility]     HPI       Ms. Monica Martinez has a history of TBI and Bipolar I disorder (other historical diagnoses include schizoaffective and borderline personality disorder, history of self-harm). She lives in a group home and follows with a psychiatric NP through the [facility] medical department monthly. Her mother joins her today for her physical.     covid vaccine. - completed at group home. Per patient:   \"Other than knee doing okay. \" - is having sharp pains in right knee. Noted with movement as well as at rest.Plans for knee replacement had to be put on hold since recovery plan is for parents to help in the immediate post-operative period and the family was affected by covid. - Has not had PT in > 1 year. This year has been isolated. Is submitting application to day program at [facility]. specifically targeted for patients with history of TBI    Continues to do well and thrive in group home. Lives in group home with 7 people. Gets along with the other residents well. PMH/PSH: reviewed and updated  Sochx/Famhx: reviewed and updated     All: No Known Allergies  Med:   Current Outpatient Medications   Medication Sig    cholecalciferol (Vitamin D3) (1000 Units /25 mcg) tablet Take 1 Tablet by mouth daily.  levothyroxine (SYNTHROID) 88 mcg tablet Take 1 Tablet by mouth Daily (before breakfast).  docusate sodium (Colace) 100 mg capsule Take 1 Capsule by mouth two (2) times a day.  oxybutynin (DITROPAN) 5 mg tablet Take 1 Tablet by mouth daily.  diphenhydramine HCl (BENADRYL PO) Take  by mouth. Takes 1.5 tab po as needed.  polyethylene glycol (MIRALAX) 17 gram/dose powder Take 17 g by mouth two (2) times a day. Hold for lose stool    cloZAPine (CLOZARIL) 100 mg tablet Take 300 mg by mouth nightly.  lamoTRIgine (LAMICTAL) 150 mg tablet Take 150 mg by mouth nightly.  lithium carbonate (LITHOBID) 600 mg capsule Take 600 mg by mouth nightly. No current facility-administered medications for this visit. ROS pertinent for the following:  Review of Systems   Constitutional: Negative for fever and malaise/fatigue. Respiratory: Negative for cough. PE:  Pulse 97, temperature 98.2 °F (36.8 °C), height 5' 1\" (1.549 m), weight 226 lb 9.6 oz (102.8 kg), last menstrual period 06/24/2013, SpO2 90 %. Body mass index is 42.82 kg/m². Physical Exam  Vitals and nursing note reviewed. Constitutional:       General: She is not in acute distress. Appearance: Normal appearance. She is obese. HENT:      Head: Normocephalic and atraumatic. Nose: Nose normal.      Mouth/Throat:      Mouth: Mucous membranes are moist.   Eyes:      Extraocular Movements: Extraocular movements intact. Conjunctiva/sclera: Conjunctivae normal.      Pupils: Pupils are equal, round, and reactive to light. Cardiovascular:      Rate and Rhythm: Normal rate and regular rhythm. Heart sounds: Normal heart sounds.    Pulmonary: Effort: Pulmonary effort is normal.      Breath sounds: Normal breath sounds. Musculoskeletal:         General: Normal range of motion. Cervical back: Normal range of motion and neck supple. Comments: tenderness along lateral joint line of right knee. Also anterior tenderness. Antalgic gait, balance limited when stepping up on exam table needs to hod on. Skin:     General: Skin is warm and dry. Neurological:      Mental Status: She is alert and oriented to person, place, and time. Comments: Fine Resting tremor noted in right arm. Psychiatric:      Comments: Flat affect. Answers questions with fluid speech, volunteers very little and brief answers. Memory is poor, often answering historically questions incorrectly (ex. Not recalling she has had more than 1 colonoscoly). No aggitaiton. Labs:   See addendum for interpretation of labs resulting after time of visit. She was given AVS and expressed understanding with the diagnosis and plan as discussed. An electronic signature was used to authenticate this note. -- Angeline Leung MD           This is the Subsequent Medicare Annual Wellness Exam, performed 12 months or more after the Initial AWV or the last Subsequent AWV    I have reviewed the patient's medical history in detail and updated the computerized patient record. Assessment/Plan   Education and counseling provided:  Are appropriate based on today's review and evaluation  Pneumococcal Vaccine  Screening Mammography    1. Morbid obesity (HCC)  -     METABOLIC PANEL, COMPREHENSIVE; Future  -     CBC W/O DIFF; Future  2. Bipolar 1 disorder (HCC)  -     METABOLIC PANEL, COMPREHENSIVE; Future  -     CBC W/O DIFF; Future  -     TSH 3RD GENERATION; Future  3. Hypothyroidism, unspecified type  -     TSH 3RD GENERATION; Future  -     levothyroxine (SYNTHROID) 88 mcg tablet; Take 1 Tablet by mouth Daily (before breakfast). , Normal, Disp-90 Tablet, R-1  4.  Cyst of left breast  5. Family history of colon cancer  6. Vitamin D deficiency  -     cholecalciferol (Vitamin D3) (1000 Units /25 mcg) tablet; Take 1 Tablet by mouth daily. , Normal, Disp-90 Tablet, R-1  7. Constipation, unspecified constipation type  -     HEMOGLOBIN A1C WITH EAG; Future  -     METABOLIC PANEL, COMPREHENSIVE; Future  -     CBC W/O DIFF; Future  -     TSH 3RD GENERATION; Future  -     docusate sodium (Colace) 100 mg capsule; Take 1 Capsule by mouth two (2) times a day., Normal, Disp-180 Capsule, R-1  8. Stress incontinence  -     oxybutynin (DITROPAN) 5 mg tablet; Take 1 Tablet by mouth daily. , Normal, Disp-30 Tablet, R-11  9. Elevated blood sugar  -     HEMOGLOBIN A1C WITH EAG; Future  10. Primary osteoarthritis of right knee  -     REFERRAL TO PHYSICAL THERAPY  11. Medicare annual wellness visit, subsequent       Depression Risk Factor Screening     3 most recent PHQ Screens 6/17/2021   Little interest or pleasure in doing things Not at all   Feeling down, depressed, irritable, or hopeless Not at all   Total Score PHQ 2 0     Alcohol Risk Screen    Do you average more than 1 drink per night or more than 7 drinks a week:  No    On any one occasion in the past three months have you have had more than 3 drinks containing alcohol:  No      Functional Ability and Level of Safety    Hearing: Hearing is good. Activities of Daily Living:   The home contains: handrails and grab bars  ADL Assessment 11/27/2019   Feeding yourself No Help Needed   Getting from bed to chair No Help Needed   Getting dressed No Help Needed   Bathing or showering No Help Needed   Walk across the room (includes cane/walker) No Help Needed   Using the telphone No Help Needed   Taking your medications Help Needed   Preparing meals Help Needed   Managing money (expenses/bills) Help Needed   Moderately strenuous housework (laundry) No Help Needed   Shopping for personal items (toiletries/medicines) No Help Needed   Shopping for groceries No Help Needed   Driving Help Needed   Climbing a flight of stairs No Help Needed   Getting to places beyond walking distances Help Needed        Ambulation: with mild difficulty     Fall Risk:  Fall Risk Assessment, last 12 mths 11/21/2018   Able to walk? Yes   Fall in past 12 months? No      Abuse Screen:  Patient is not abused       Cognitive Screening    Has your family/caregiver stated any concerns about your memory: yes - chronic and not activity noted to be changing. Health Maintenance Due     Health Maintenance Due   Topic Date Due    COVID-19 Vaccine (1) Never done    Shingrix Vaccine Age 50> (1 of 2) Never done    A1C test (Diabetic or Prediabetic)  06/09/2021       Patient Care Team   Patient Care Team:  Elsa Real MD as PCP - General (Internal Medicine)  Elsa Real MD as PCP - [facility] Provider  ALICIA Bennett MD (Orthopedic Surgery)    History     Patient Active Problem List   Diagnosis Code    Hypothyroidism E03.9    Sleep disorder G47.9    Bipolar 1 disorder ([address] F31.9    Vitamin D deficiency E55.9    OA (osteoarthritis) of knee M17.10    Constipation K59.00    Morbid obesity ([address] E66.01    Elevated hemoglobin A1c R73.09    Degenerative joint disease (DJD) of hip M16.9    History of traumatic brain injury Z87.820    Family history of colon cancer Z80.0    Depression F32.9    Cyst of left breast N60.02    Stress incontinence in female N39.3     Past Medical History:   Diagnosis Date    Arthritis     Bipolar 1 disorder ([address] 12/17/2012    Cervical spine fracture ([address]     pt states she has never had this    Cyst of breast, right, solitary 11/18/2015    mammogram from 11/13/2015, Sue Patient found to have complex cyst in right breast measuring 8mm.   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 HX GYN  2013    hysteroscopy with novasure ablation for fibroids. - surgeon - [facility]    HX HEENT  2007    RECONSTRUCTION OF JAW - once per pt on 1/3/2020    HX HIP REPLACEMENT Left 2016    HX ORTHOPAEDIC      patella fracture RT    OH RECONSTR JAW,FULL,ENDO IMPLNT       Current Outpatient Medications   Medication Sig Dispense Refill    cholecalciferol (Vitamin D3) (1000 Units /25 mcg) tablet Take 1 Tablet by mouth daily. 90 Tablet 1    levothyroxine (SYNTHROID) 88 mcg tablet Take 1 Tablet by mouth Daily (before breakfast). 90 Tablet 1    docusate sodium (Colace) 100 mg capsule Take 1 Capsule by mouth two (2) times a day. 180 Capsule 1    oxybutynin (DITROPAN) 5 mg tablet Take 1 Tablet by mouth daily. 30 Tablet 11    diphenhydramine HCl (BENADRYL PO) Take  by mouth. Takes 1.5 tab po as needed.  polyethylene glycol (MIRALAX) 17 gram/dose powder Take 17 g by mouth two (2) times a day. Hold for lose stool 1530 g 11    cloZAPine (CLOZARIL) 100 mg tablet Take 300 mg by mouth nightly.  lamoTRIgine (LAMICTAL) 150 mg tablet Take 150 mg by mouth nightly.  lithium carbonate (LITHOBID) 600 mg capsule Take 600 mg by mouth nightly.        No Known Allergies    Family History   Problem Relation Age of Onset   [facility] Asthma Mother     Asthma Father     Alzheimer Father     No Known Problems Brother      Social History     Tobacco Use    Smoking status: Never Smoker    Smokeless tobacco: Never Used   Substance Use Topics    Alcohol use: No     Alcohol/week: 0.0 standard drinks         Demetrio Robertson MD

## 2021-06-17 NOTE — LETTER
Name: Chelo Miles   Sex: female   : 1965 [address] 
[phone] (home) [phone] (work) Current Immunizations: 
Immunization History Administered Date(s) Administered  Influenza Vaccine 2014, 2018, 2019  Influenza Vaccine [employer]) PF (>6 Mo Flulaval, Fluarix, and >3 Yrs Julian, Fluzone 10304) 10/16/2013, 10/21/2015, 10/03/2016  PPD 10/14/2009  TB Skin Test (PPD) Intradermal 10/16/2013, 10/20/2014, 10/21/2015, 10/03/2016, 2018  Tdap 2015 Allergies: Allergies as of 2021  (No Known Allergies) Current Outpatient Medications on File Prior to Visit Medication Sig Dispense Refill  diphenhydramine HCl (BENADRYL PO) Take  by mouth. Takes 1.5 tab po as needed.  polyethylene glycol (MIRALAX) 17 gram/dose powder Take 17 g by mouth two (2) times a day. Hold for lose stool 1530 g 11  
 cloZAPine (CLOZARIL) 100 mg tablet Take 300 mg by mouth nightly.  lamoTRIgine (LAMICTAL) 150 mg tablet Take 150 mg by mouth nightly.  lithium carbonate (LITHOBID) 600 mg capsule Take 600 mg by mouth nightly.  [DISCONTINUED] cholecalciferol (Vitamin D3) (1000 Units /25 mcg) tablet Take 1 Tab by mouth daily. 90 Tab 1  [DISCONTINUED] levothyroxine (SYNTHROID) 88 mcg tablet Take 1 Tab by mouth Daily (before breakfast). 90 Tab 1  [DISCONTINUED] docusate sodium (Colace) 100 mg capsule Take 1 Cap by mouth two (2) times a day. 180 Cap 1  
 [DISCONTINUED] oxybutynin (DITROPAN) 5 mg tablet Take 1 Tab by mouth daily. 30 Tab 11 No current facility-administered medications on file prior to visit.

## 2021-06-18 NOTE — PROGRESS NOTES
Normal range blood sugar (A1C)  Normal liver and kidney function. Thyroid function in goal range. Continue current medications.

## 2021-10-12 DIAGNOSIS — K59.00 CONSTIPATION, UNSPECIFIED CONSTIPATION TYPE: ICD-10-CM

## 2021-10-12 NOTE — TELEPHONE ENCOUNTER
Patsy Parnell from [facility] [employer] Left a voice message requesting a refill on behalf of the pt. Last visit 06/17/2021 MD Annita Gutierrez   Next appointment 12/17/2021 MD Annita Gutierrez   Previous refill encounter(s)   04/24/2019 Miralax #1,530 grams with 11 refills     Requested Prescriptions     Pending Prescriptions Disp Refills    polyethylene glycol (Miralax) 17 gram/dose powder 1530 g 11     Sig: Take 17 g by mouth two (2) times a day.  Hold for lose stool

## 2021-10-13 RX ORDER — POLYETHYLENE GLYCOL 3350 17 G/17G
POWDER, FOR SOLUTION ORAL
Qty: 1530 G | Refills: 11 | Status: SHIPPED | OUTPATIENT
Start: 2021-10-13 | End: 2022-02-02 | Stop reason: SDUPTHER

## 2021-12-17 ENCOUNTER — OFFICE VISIT (OUTPATIENT)
Dept: INTERNAL MEDICINE CLINIC | Age: 56
End: 2021-12-17
Payer: MEDICARE

## 2021-12-17 VITALS
DIASTOLIC BLOOD PRESSURE: 85 MMHG | HEIGHT: 61 IN | OXYGEN SATURATION: 97 % | TEMPERATURE: 97.9 F | BODY MASS INDEX: 43.09 KG/M2 | HEART RATE: 94 BPM | SYSTOLIC BLOOD PRESSURE: 128 MMHG | WEIGHT: 228.25 LBS

## 2021-12-17 DIAGNOSIS — R41.89 COGNITIVE IMPAIRMENT: ICD-10-CM

## 2021-12-17 DIAGNOSIS — E55.9 VITAMIN D DEFICIENCY: ICD-10-CM

## 2021-12-17 DIAGNOSIS — E66.01 MORBID OBESITY (HCC): Primary | ICD-10-CM

## 2021-12-17 DIAGNOSIS — R73.09 ELEVATED HEMOGLOBIN A1C: ICD-10-CM

## 2021-12-17 DIAGNOSIS — R06.83 SNORING: ICD-10-CM

## 2021-12-17 DIAGNOSIS — Z87.820 HISTORY OF TRAUMATIC BRAIN INJURY: ICD-10-CM

## 2021-12-17 DIAGNOSIS — E03.9 HYPOTHYROIDISM, UNSPECIFIED TYPE: ICD-10-CM

## 2021-12-17 DIAGNOSIS — F31.9 BIPOLAR 1 DISORDER (HCC): ICD-10-CM

## 2021-12-17 PROCEDURE — G9899 SCRN MAM PERF RSLTS DOC: HCPCS | Performed by: INTERNAL MEDICINE

## 2021-12-17 PROCEDURE — G0463 HOSPITAL OUTPT CLINIC VISIT: HCPCS | Performed by: INTERNAL MEDICINE

## 2021-12-17 PROCEDURE — 99214 OFFICE O/P EST MOD 30 MIN: CPT | Performed by: INTERNAL MEDICINE

## 2021-12-17 PROCEDURE — G8417 CALC BMI ABV UP PARAM F/U: HCPCS | Performed by: INTERNAL MEDICINE

## 2021-12-17 PROCEDURE — G8427 DOCREV CUR MEDS BY ELIG CLIN: HCPCS | Performed by: INTERNAL MEDICINE

## 2021-12-17 PROCEDURE — G9717 DOC PT DX DEP/BP F/U NT REQ: HCPCS | Performed by: INTERNAL MEDICINE

## 2021-12-17 PROCEDURE — 3017F COLORECTAL CA SCREEN DOC REV: CPT | Performed by: INTERNAL MEDICINE

## 2021-12-17 NOTE — PROGRESS NOTES
RM 3    Chief Complaint   Patient presents with    Routine    Follow-up     6 month follow-up    Medication Evaluation     1. Have you been to the ER, urgent care clinic since your last visit? Hospitalized since your last visit? No    2. Have you seen or consulted any other health care providers outside of the [address] [place] [address] since your last visit? Include any pap smears or colon screening. No    Health Maintenance Due   Topic Date Due    COVID-19 Vaccine (1) Never done    Cervical cancer screen  Never done    Shingrix Vaccine Age 50> (1 of 2) Never done    Flu Vaccine (1) 09/01/2021       Learning Assessment 10/20/2014   PRIMARY LEARNER Healthcare POA   PRIMARY LANGUAGE ENGLISH   LEARNER PREFERENCE PRIMARY LISTENING     READING   ANSWERED BY Mother   RELATIONSHIP GRANDPARENT         AVS  education, follow up, and recommendations provided and addressed with patient.   services used to advise patient No

## 2021-12-17 NOTE — PROGRESS NOTES
A/P:  [place] is a 60 y.o. female, she presents today for:    1. Morbid obesity (HCC)  -     METABOLIC PANEL, COMPREHENSIVE; Future  -     LIPID PANEL; Future  -     REFERRAL TO SLEEP STUDIES  2. Bipolar 1 disorder (HCC)  -     METABOLIC PANEL, COMPREHENSIVE; Future  -     LITHIUM; Future  3. Cognitive impairment  -     METABOLIC PANEL, COMPREHENSIVE; Future  -     REFERRAL TO SLEEP STUDIES  4. Elevated hemoglobin A1c  -     HEMOGLOBIN A1C WITH EAG; Future  5. Hypothyroidism, unspecified type  -     TSH 3RD GENERATION; Future  -     METABOLIC PANEL, COMPREHENSIVE; Future  -     LIPID PANEL; Future  6. Vitamin D deficiency  -     METABOLIC PANEL, COMPREHENSIVE; Future  -     VITAMIN D, 25 HYDROXY; Future  7. Snoring  -     REFERRAL TO SLEEP STUDIES  8. History of traumatic brain injury    Hypothyroid:  repeat tsh today. Euthyroid on exam.      Elevated A1C: limited dietary control in group home status and given patient functional mental status.     Obesity:    - with snoring and daytime sleepiness - referred to sleep medicine for screening for MICH. 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 Reports stable on psychiatric meds. PMH/PSH: reviewed and updated  Sochx/Famhx: reviewed and updated     All: No Known Allergies  Med:   Current Outpatient Medications   Medication Sig    polyethylene glycol (Miralax) 17 gram/dose powder Take 17 g by mouth two (2) times a day. Hold for lose stool    cholecalciferol (Vitamin D3) (1000 Units /25 mcg) tablet Take 1 Tablet by mouth daily.     levothyroxine (SYNTHROID) 88 mcg tablet Take 1 Tablet by mouth Daily (before breakfast).  docusate sodium (Colace) 100 mg capsule Take 1 Capsule by mouth two (2) times a day.  oxybutynin (DITROPAN) 5 mg tablet Take 1 Tablet by mouth daily.  diphenhydramine HCl (BENADRYL PO) Take  by mouth. Takes 1.5 tab po as needed.  cloZAPine (CLOZARIL) 100 mg tablet Take 300 mg by mouth nightly.  lamoTRIgine (LAMICTAL) 150 mg tablet Take 150 mg by mouth nightly.  lithium carbonate (LITHOBID) 600 mg capsule Take 600 mg by mouth nightly. No current facility-administered medications for this visit. ROS pertinent for the following:  Review of Systems   Constitutional: Negative for chills, fever and malaise/fatigue. Respiratory: Negative for shortness of breath. Cardiovascular: Negative for chest pain. PE:  Blood pressure 128/85, pulse 94, temperature 97.9 °F (36.6 °C), temperature source Oral, height 5' 1\" (1.549 m), weight 228 lb 4 oz (103.5 kg), last menstrual period 06/24/2013, SpO2 97 %. Body mass index is 43.13 kg/m². Physical Exam  Vitals and nursing note reviewed. Constitutional:       General: She is not in acute distress. Appearance: Normal appearance. She is obese. HENT:      Head: Normocephalic and atraumatic. Nose: Nose normal.      Mouth/Throat:      Mouth: Mucous membranes are moist.   Eyes:      Extraocular Movements: Extraocular movements intact. Conjunctiva/sclera: Conjunctivae normal.      Pupils: Pupils are equal, round, and reactive to light. Cardiovascular:      Rate and Rhythm: Normal rate and regular rhythm. Heart sounds: Normal heart sounds. Pulmonary:      Effort: Pulmonary effort is normal.      Breath sounds: Normal breath sounds. Musculoskeletal:         General: Normal range of motion. Cervical back: Normal range of motion and neck supple. Comments: Antalgic gait, balance limited when stepping up on exam table needs to hold on. Skin:     General: Skin is warm and dry. Neurological:      Mental Status: She is alert and oriented to person, place, and time. Psychiatric:      Comments: Flat affect. Answers questions with fluid speech, volunteers very little and brief answers. Memory is poor, often answering historically questions incorrectly. No aggitaiton. Labs:   See addendum for interpretation of labs resulting after time of visit. No results found for any visits on 12/17/21. She was given AVS and expressed understanding with the diagnosis and plan as discussed. An electronic signature was used to authenticate this note.   -- [facility], MD

## 2021-12-18 LAB
25(OH)D3 SERPL-MCNC: 34.1 NG/ML (ref 30–100)
ALBUMIN SERPL-MCNC: 4.2 G/DL (ref 3.5–5)
ALBUMIN/GLOB SERPL: 1.4 {RATIO} (ref 1.1–2.2)
ALP SERPL-CCNC: 119 U/L (ref 45–117)
ALT SERPL-CCNC: 28 U/L (ref 12–78)
ANION GAP SERPL CALC-SCNC: 2 MMOL/L (ref 5–15)
AST SERPL-CCNC: 14 U/L (ref 15–37)
BILIRUB SERPL-MCNC: 0.5 MG/DL (ref 0.2–1)
BUN SERPL-MCNC: 11 MG/DL (ref 6–20)
BUN/CREAT SERPL: 13 (ref 12–20)
CALCIUM SERPL-MCNC: 9.9 MG/DL (ref 8.5–10.1)
CHLORIDE SERPL-SCNC: 109 MMOL/L (ref 97–108)
CHOLEST SERPL-MCNC: 158 MG/DL
CO2 SERPL-SCNC: 30 MMOL/L (ref 21–32)
CREAT SERPL-MCNC: 0.87 MG/DL (ref 0.55–1.02)
DATE LAST DOSE: ABNORMAL
EST. AVERAGE GLUCOSE BLD GHB EST-MCNC: 117 MG/DL
GLOBULIN SER CALC-MCNC: 3 G/DL (ref 2–4)
GLUCOSE SERPL-MCNC: 101 MG/DL (ref 65–100)
HBA1C MFR BLD: 5.7 % (ref 4–5.6)
HDLC SERPL-MCNC: 52 MG/DL
HDLC SERPL: 3 {RATIO} (ref 0–5)
LDLC SERPL CALC-MCNC: 85.2 MG/DL (ref 0–100)
LITHIUM SERPL-SCNC: 0.42 MMOL/L (ref 0.6–1.2)
POTASSIUM SERPL-SCNC: 4.1 MMOL/L (ref 3.5–5.1)
PROT SERPL-MCNC: 7.2 G/DL (ref 6.4–8.2)
REPORTED DOSE,DOSE: ABNORMAL UNITS
REPORTED DOSE/TIME,TMG: ABNORMAL
SODIUM SERPL-SCNC: 141 MMOL/L (ref 136–145)
TRIGL SERPL-MCNC: 104 MG/DL (ref ?–150)
TSH SERPL DL<=0.05 MIU/L-ACNC: 1.37 UIU/ML (ref 0.36–3.74)
VLDLC SERPL CALC-MCNC: 20.8 MG/DL

## 2021-12-19 NOTE — PROGRESS NOTES
A1C is mildly elevated again - reflecting a higher than normal blood sugar. As discussed during visit there is an option to try addition of GLP-1 class medication for goal of weight loss. Ongoing activity and healthy dietary choices remain ideal. Continue to avoid sugary beverages. Labs show normal range for the following:   CBC: normal blood counts. No anemia. CMP: normal liver and kidney function. - mild elevation in Alk Phos is non-specific and stable. A low AST is not a concern. Glucose mildly elevated (see above)  TSH: normal thyroid function  Lipid: overall good balance of cholesterol. Lithium level : stable.    Vitamin D: stable and normal.

## 2022-02-01 ENCOUNTER — PATIENT MESSAGE (OUTPATIENT)
Dept: INTERNAL MEDICINE CLINIC | Age: 57
End: 2022-02-01

## 2022-02-01 DIAGNOSIS — E55.9 VITAMIN D DEFICIENCY: ICD-10-CM

## 2022-02-01 DIAGNOSIS — E03.9 HYPOTHYROIDISM, UNSPECIFIED TYPE: ICD-10-CM

## 2022-02-01 DIAGNOSIS — N39.3 STRESS INCONTINENCE: ICD-10-CM

## 2022-02-01 DIAGNOSIS — K59.00 CONSTIPATION, UNSPECIFIED CONSTIPATION TYPE: ICD-10-CM

## 2022-02-02 DIAGNOSIS — N39.3 STRESS INCONTINENCE: ICD-10-CM

## 2022-02-02 DIAGNOSIS — E03.9 HYPOTHYROIDISM, UNSPECIFIED TYPE: ICD-10-CM

## 2022-02-02 DIAGNOSIS — E55.9 VITAMIN D DEFICIENCY: ICD-10-CM

## 2022-02-02 DIAGNOSIS — K59.00 CONSTIPATION, UNSPECIFIED CONSTIPATION TYPE: ICD-10-CM

## 2022-02-02 RX ORDER — DOCUSATE SODIUM 100 MG/1
100 CAPSULE, LIQUID FILLED ORAL 2 TIMES DAILY
Qty: 60 CAPSULE | Refills: 11 | Status: SHIPPED | OUTPATIENT
Start: 2022-02-02

## 2022-02-02 RX ORDER — LITHIUM CARBONATE 600 MG/1
600 CAPSULE ORAL
Qty: 90 CAPSULE | Refills: 0 | Status: CANCELLED | OUTPATIENT
Start: 2022-02-02

## 2022-02-02 RX ORDER — LAMOTRIGINE 150 MG/1
150 TABLET ORAL
Qty: 90 TABLET | Refills: 0 | Status: CANCELLED | OUTPATIENT
Start: 2022-02-02

## 2022-02-02 RX ORDER — POLYETHYLENE GLYCOL 3350 17 G/17G
POWDER, FOR SOLUTION ORAL
Qty: 1530 G | Refills: 11 | Status: CANCELLED | OUTPATIENT
Start: 2022-02-02

## 2022-02-02 RX ORDER — OXYBUTYNIN CHLORIDE 5 MG/1
5 TABLET ORAL DAILY
Qty: 30 TABLET | Refills: 11 | Status: SHIPPED | OUTPATIENT
Start: 2022-02-02

## 2022-02-02 RX ORDER — LEVOTHYROXINE SODIUM 88 UG/1
88 TABLET ORAL
Qty: 30 TABLET | Refills: 11 | Status: SHIPPED | OUTPATIENT
Start: 2022-02-02

## 2022-02-02 RX ORDER — MELATONIN
1000 DAILY
Qty: 30 TABLET | Refills: 11 | Status: SHIPPED | OUTPATIENT
Start: 2022-02-02

## 2022-02-02 RX ORDER — CLOZAPINE 100 MG/1
300 TABLET ORAL
Qty: 90 TABLET | Refills: 0 | Status: CANCELLED | OUTPATIENT
Start: 2022-02-02

## 2022-02-02 RX ORDER — POLYETHYLENE GLYCOL 3350 17 G/17G
POWDER, FOR SOLUTION ORAL
Qty: 1530 G | Refills: 11 | Status: SHIPPED | OUTPATIENT
Start: 2022-02-02

## 2022-02-02 RX ORDER — OXYBUTYNIN CHLORIDE 5 MG/1
5 TABLET ORAL DAILY
Qty: 30 TABLET | Refills: 11 | Status: CANCELLED | OUTPATIENT
Start: 2022-02-02

## 2022-02-02 RX ORDER — LEVOTHYROXINE SODIUM 88 UG/1
88 TABLET ORAL
Qty: 90 TABLET | Refills: 1 | Status: CANCELLED | OUTPATIENT
Start: 2022-02-02

## 2022-02-02 RX ORDER — DOCUSATE SODIUM 100 MG/1
100 CAPSULE, LIQUID FILLED ORAL 2 TIMES DAILY
Qty: 180 CAPSULE | Refills: 1 | Status: CANCELLED | OUTPATIENT
Start: 2022-02-02

## 2022-02-02 RX ORDER — MELATONIN
1000 DAILY
Qty: 90 TABLET | Refills: 1 | Status: CANCELLED | OUTPATIENT
Start: 2022-02-02

## 2022-02-02 NOTE — TELEPHONE ENCOUNTER
From: Cyn Rico  To: Chrissy Wisdom MD  Sent: 2/1/2022 9:42 AM EST  Subject: Gwendolyn's prescriptions    Good morning, Dr Glenn Lopez. [facility] has sent papers to [facility] where Mart[facility]nez Mota lives requesting renewals for ALL her prescriptions beginning 1/2022. [facility] just received these papers yesterday. [facility] requests that you sign and send in the papers but it seems to me easier for you to just call [facility] and renew the prescriptions that you have ordered for her by telephone. What do you think? I do have the papers and I can mail them to you if you think that is necessary. I hope you are well and keeping warm. Thank you for your help.  Neris Feliciano (Gwendolyn's mother)

## 2022-02-02 NOTE — TELEPHONE ENCOUNTER
Duplicate request:   - 61/24/2798 Ditropan 5 mg #30 with 11 refills,   - 10/13/2021 Miralax #1,530 grams with 11 refills was sent to [address] [place] [address]. Historical medication:   - Clozaril 100 mg,   - Lithobid 600 mg,  - Lamictal 150 mg. Last visit 12/17/2021 MD Jaleel Olson   Next appointment 05/20/2022 MD Jaleel Olson   Previous refill encounter(s)   06/17/2021:  - Colace #180 with 1 refill,   - Synthroid #90 with 1 refill,   - Vitamin D3 #90 with 1 refill,      Requested Prescriptions     Pending Prescriptions Disp Refills    cholecalciferol (Vitamin D3) (1000 Units /25 mcg) tablet 90 Tablet 1     Sig: Take 1 Tablet by mouth daily.  cloZAPine (CLOZARIL) 100 mg tablet 90 Tablet 0     Sig: Take 3 Tablets by mouth nightly.  docusate sodium (Colace) 100 mg capsule 180 Capsule 1     Sig: Take 1 Capsule by mouth two (2) times a day.  lamoTRIgine (LaMICtaL) 150 mg tablet 90 Tablet 0     Sig: Take 1 Tablet by mouth nightly.  levothyroxine (SYNTHROID) 88 mcg tablet 90 Tablet 1     Sig: Take 1 Tablet by mouth Daily (before breakfast).  lithium carbonate 600 mg capsule 90 Capsule 0     Sig: Take 1 Capsule by mouth nightly.  oxybutynin (DITROPAN) 5 mg tablet 30 Tablet 11     Sig: Take 1 Tablet by mouth daily.  polyethylene glycol (Miralax) 17 gram/dose powder 1530 g 11     Sig: Take 17 g by mouth two (2) times a day.  Hold for lose stool

## 2022-02-04 NOTE — TELEPHONE ENCOUNTER
As discussed with lissy, psychiatric medications will be filled by psychiatric team    Chrissy Wisdom MD

## 2022-03-09 LAB — PAP SMEAR, EXTERNAL: NORMAL

## 2022-03-18 PROBLEM — R41.89 COGNITIVE IMPAIRMENT: Status: ACTIVE | Noted: 2021-06-17

## 2022-03-18 PROBLEM — N39.3 STRESS INCONTINENCE: Status: ACTIVE | Noted: 2021-06-17

## 2022-03-19 PROBLEM — N60.02 CYST OF LEFT BREAST: Status: ACTIVE | Noted: 2018-08-14

## 2022-04-25 ENCOUNTER — PATIENT MESSAGE (OUTPATIENT)
Dept: INTERNAL MEDICINE CLINIC | Age: 57
End: 2022-04-25

## 2022-04-26 NOTE — TELEPHONE ENCOUNTER
Called and spoke with Gwendolyn's mother Gordon Ruiz. She had covid test yesterday that was NEGATIVE. No further testing needed at this time.      Mode Nicholson MD

## 2022-04-26 NOTE — TELEPHONE ENCOUNTER
----- Message from Rachelle Joseph sent at 4/25/2022  2:52 PM EDT -----  Subject: Message to Provider    QUESTIONS  Information for Provider? Pt is calling to inform PCP that someone she   lives with in the adult home she stays in has test positive about a week   ago. Pt has taken an at home Covid test and has a positive test result. Pt   is showing no symptoms. Please contact pt as soon as possible.   ---------------------------------------------------------------------------  --------------  CALL BACK INFO  What is the best way for the office to contact you? OK to leave message on   voicemail  Preferred Call Back Phone Number? [phone]  ---------------------------------------------------------------------------  --------------  SCRIPT ANSWERS  Relationship to Patient?  Self

## 2022-05-20 ENCOUNTER — OFFICE VISIT (OUTPATIENT)
Dept: INTERNAL MEDICINE CLINIC | Age: 57
End: 2022-05-20
Payer: MEDICARE

## 2022-05-20 VITALS
BODY MASS INDEX: 39.84 KG/M2 | RESPIRATION RATE: 16 BRPM | HEART RATE: 86 BPM | HEIGHT: 61 IN | WEIGHT: 211 LBS | TEMPERATURE: 97.9 F | SYSTOLIC BLOOD PRESSURE: 107 MMHG | DIASTOLIC BLOOD PRESSURE: 76 MMHG | OXYGEN SATURATION: 94 %

## 2022-05-20 DIAGNOSIS — F31.9 BIPOLAR 1 DISORDER (HCC): Primary | ICD-10-CM

## 2022-05-20 DIAGNOSIS — E03.9 HYPOTHYROIDISM, UNSPECIFIED TYPE: ICD-10-CM

## 2022-05-20 DIAGNOSIS — L85.8 CUTANEOUS HORN: ICD-10-CM

## 2022-05-20 DIAGNOSIS — E66.01 MORBID OBESITY (HCC): ICD-10-CM

## 2022-05-20 DIAGNOSIS — B07.8 OTHER VIRAL WARTS: ICD-10-CM

## 2022-05-20 DIAGNOSIS — E88.81 INSULIN RESISTANCE: ICD-10-CM

## 2022-05-20 DIAGNOSIS — R73.09 ELEVATED HEMOGLOBIN A1C: ICD-10-CM

## 2022-05-20 PROCEDURE — 99214 OFFICE O/P EST MOD 30 MIN: CPT | Performed by: INTERNAL MEDICINE

## 2022-05-20 PROCEDURE — 3017F COLORECTAL CA SCREEN DOC REV: CPT | Performed by: INTERNAL MEDICINE

## 2022-05-20 PROCEDURE — G9717 DOC PT DX DEP/BP F/U NT REQ: HCPCS | Performed by: INTERNAL MEDICINE

## 2022-05-20 PROCEDURE — 17110 DESTRUCTION B9 LES UP TO 14: CPT | Performed by: INTERNAL MEDICINE

## 2022-05-20 PROCEDURE — G8417 CALC BMI ABV UP PARAM F/U: HCPCS | Performed by: INTERNAL MEDICINE

## 2022-05-20 PROCEDURE — G9899 SCRN MAM PERF RSLTS DOC: HCPCS | Performed by: INTERNAL MEDICINE

## 2022-05-20 PROCEDURE — G0463 HOSPITAL OUTPT CLINIC VISIT: HCPCS | Performed by: INTERNAL MEDICINE

## 2022-05-20 PROCEDURE — G8427 DOCREV CUR MEDS BY ELIG CLIN: HCPCS | Performed by: INTERNAL MEDICINE

## 2022-05-20 RX ORDER — METFORMIN HYDROCHLORIDE 500 MG/1
500 TABLET ORAL
Qty: 90 TABLET | Refills: 4 | Status: SHIPPED | OUTPATIENT
Start: 2022-05-20

## 2022-05-20 NOTE — PROGRESS NOTES
RM 3    Chief Complaint   Patient presents with    Follow-up      6 month follow-up       Visit Vitals  /76 (BP 1 Location: Left upper arm, BP Patient Position: Sitting, BP Cuff Size: Large adult)   Pulse 86   Temp 97.9 °F (36.6 °C) (Oral)   Resp 16   Ht 5' 1\" (1.549 m)   Wt 211 lb (95.7 kg)   SpO2 94%   BMI 39.87 kg/m²       3 most recent PHQ Screens 5/20/2022   Little interest or pleasure in doing things Not at all   Feeling down, depressed, irritable, or hopeless Not at all   Total Score PHQ 2 0         1. Have you been to the ER, urgent care clinic since your last visit? Hospitalized since your last visit? No    2. Have you seen or consulted any other health care providers outside of the [address] [place] [address] since your last visit? Include any pap smears or colon screening. No    Health Maintenance Due   Topic Date Due    COVID-19 Vaccine (1) Never done    Shingrix Vaccine Age 50> (1 of 2) Never done    Medicare Yearly Exam  06/18/2022       Learning Assessment 10/20/2014   PRIMARY LEARNER Healthcare POA   PRIMARY LANGUAGE ENGLISH   LEARNER PREFERENCE PRIMARY LISTENING     READING   ANSWERED BY Mother   RELATIONSHIP GRANDPARENT       AVS  education, follow up, and recommendations provided and addressed with patient.  services used to advise patient. no

## 2022-05-20 NOTE — PROGRESS NOTES
A/P:  [place] is a 59 y.o. female, she presents today for:    1. Bipolar 1 disorder (HCC)  -     LITHIUM; Future  -     METABOLIC PANEL, COMPREHENSIVE; Future  -     CBC WITH AUTOMATED DIFF; Future  -     TSH 3RD GENERATION; Future  2. Elevated hemoglobin A1c  -     HEMOGLOBIN A1C WITH EAG; Future  -     METABOLIC PANEL, COMPREHENSIVE; Future  -     CBC WITH AUTOMATED DIFF; Future  -     TSH 3RD GENERATION; Future  -     metFORMIN (GLUCOPHAGE) 500 mg tablet; Take 1 Tablet by mouth daily (with breakfast). , Normal, Disp-90 Tablet, R-4  3. Hypothyroidism, unspecified type  -     TSH 3RD GENERATION; Future  4. Morbid obesity (HCC)  -     METABOLIC PANEL, COMPREHENSIVE; Future  -     CBC WITH AUTOMATED DIFF; Future  -     TSH 3RD GENERATION; Future  5. Insulin resistance  -     metFORMIN (GLUCOPHAGE) 500 mg tablet; Take 1 Tablet by mouth daily (with breakfast). , Normal, Disp-90 Tablet, R-4  6. Cutaneous horn  -     DESTRUC BENIGN LESION, UP TO 14 LESIONS  7. Other viral warts   -     DESTRUC BENIGN LESION, UP TO 14 LESIONS      Hypothyroid:  repeat tsh today. Euthyroid on exam.      Elevated A1C: limited dietary control in group home status and given patient functional mental status.    - metformin trial start of medication today - low dose - 1 tablet daily    Obesity:  - losing weight over past 12 months. 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   [phone]      Future Appointments   Date Time Provider Mery Brown   5/20/2022  8:30 AM Nabila Stewart MD [facility]       HPI    EMR linking 3/9/2022 Pap done by augusto PETIT and HPV negative    PMH/PSH: reviewed and updated  Sochx/Famhx: reviewed and updated     All: No Known Allergies  Med:   Current Outpatient Medications   Medication Sig    cholecalciferol (Vitamin D3) (1000 Units /25 mcg) tablet Take 1 Tablet by mouth daily.  docusate sodium (Colace) 100 mg capsule Take 1 Capsule by mouth two (2) times a day.  levothyroxine (SYNTHROID) 88 mcg tablet Take 1 Tablet by mouth Daily (before breakfast).  oxybutynin (DITROPAN) 5 mg tablet Take 1 Tablet by mouth daily.  polyethylene glycol (Miralax) 17 gram/dose powder Take 17 g by mouth two (2) times a day. Hold for lose stool    diphenhydramine HCl (BENADRYL PO) Take  by mouth. Takes 1.5 tab po as needed.  cloZAPine (CLOZARIL) 100 mg tablet Take 300 mg by mouth nightly.  lamoTRIgine (LAMICTAL) 150 mg tablet Take 150 mg by mouth nightly.  lithium carbonate (LITHOBID) 600 mg capsule Take 600 mg by mouth nightly. No current facility-administered medications for this visit. ROS pertinent for the following:  Review of Systems   Constitutional: Negative for chills, fever and malaise/fatigue. Respiratory: Negative for shortness of breath. Cardiovascular: Negative for chest pain and leg swelling. Neurological: Negative for dizziness. PE:  Blood pressure 107/76, pulse 86, temperature 97.9 °F (36.6 °C), temperature source Oral, resp. rate 16, height 5' 1\" (1.549 m), weight 211 lb (95.7 kg), last menstrual period 06/24/2013, SpO2 94 %. Body mass index is 39.87 kg/m². Physical Exam  Vitals and nursing note reviewed. Constitutional:       General: She is not in acute distress. Appearance: Normal appearance. HENT:      Head: Normocephalic and atraumatic.       Nose: Nose normal.      Mouth/Throat: Mouth: Mucous membranes are moist.   Eyes:      Extraocular Movements: Extraocular movements intact. Conjunctiva/sclera: Conjunctivae normal.      Pupils: Pupils are equal, round, and reactive to light. Cardiovascular:      Rate and Rhythm: Normal rate and regular rhythm. Heart sounds: Normal heart sounds. Pulmonary:      Effort: Pulmonary effort is normal.      Breath sounds: Normal breath sounds. Musculoskeletal:         General: Normal range of motion. Cervical back: Normal range of motion and neck supple. Skin:     General: Skin is warm and dry. Comments: 3 mm diameter filamentous projection on left upper chest wall    Neurological:      Mental Status: She is alert and oriented to person, place, and time. Liquid nitrogen cryotherapy performed. Applied x 3 with thaw in between. Procedure tolerated well, no complication. Labs:   See addendum for interpretation of labs resulting after time of visit. She was given AVS and expressed understanding with the diagnosis and plan as discussed. An electronic signature was used to authenticate this note.   -- Gume Rogel MD

## 2022-05-20 NOTE — PATIENT INSTRUCTIONS
Actinic Keratosis: Care Instructions  Your Care Instructions  Actinic keratosis is a skin growth caused by sun damage. It can turn into skin cancer, but this isn't common. Actinic keratoses, also called solar keratoses, are small red, brown, or skin-colored scaly patches. They are most common on the face, neck, hands, and forearms. Your doctor can remove these growths by freezing or scraping them off or by putting medicines on them. Follow-up care is a key part of your treatment and safety. Be sure to make and go to all appointments, and call your doctor if you are having problems. It's also a good idea to know your test results and keep a list of the medicines you take. How can you care for yourself at home? · If your doctor told you how to care for the treated area, follow your doctor's instructions. If you did not get instructions, follow this general advice:  ? Wash around the area with clean water 2 times a day. Don't use hydrogen peroxide or alcohol, which can slow healing. ? You may cover the area with a thin layer of petroleum jelly, such as Vaseline, and a nonstick bandage. ? Apply more petroleum jelly and replace the bandage as needed. To prevent actinic keratosis  · Always wear sunscreen on exposed skin. Make sure to use a broad-spectrum sunscreen that has a sun protection factor (SPF) of 30 or higher. Use it every day, even when it is cloudy. · Wear long sleeves, a hat, and pants if you are going to be outdoors for a long time. · Avoid the sun between 10 a.m. and 4 p.m., the peak time for UV rays. · Do not use tanning booths or sunlamps. When should you call for help? Watch closely for changes in your health, and be sure to contact your doctor if:    · You have symptoms of infection, such as:  ? Increased pain, swelling, warmth, or redness. ? Red streaks leading from the area. ? Pus draining from the area. ? A fever. Where can you learn more?   Go to http://www.gray.com/  Enter L364 in the search box to learn more about \"Actinic Keratosis: Care Instructions. \"  Current as of: November 15, 2021               Content Version: 13.2  © 6535-6584 [employer]. Care instructions adapted under license by [employer] (which disclaims liability or warranty for this information). If you have questions about a medical condition or this instruction, always ask your healthcare professional. [place] [address] any warranty or liability for your use of this information.

## 2022-05-21 LAB
ALBUMIN SERPL-MCNC: 4.3 G/DL (ref 3.5–5)
ALBUMIN/GLOB SERPL: 1.3 {RATIO} (ref 1.1–2.2)
ALP SERPL-CCNC: 126 U/L (ref 45–117)
ALT SERPL-CCNC: 34 U/L (ref 12–78)
ANION GAP SERPL CALC-SCNC: 4 MMOL/L (ref 5–15)
AST SERPL-CCNC: 20 U/L (ref 15–37)
BASOPHILS # BLD: 0.1 K/UL (ref 0–0.1)
BASOPHILS NFR BLD: 1 % (ref 0–1)
BILIRUB SERPL-MCNC: 0.5 MG/DL (ref 0.2–1)
BUN SERPL-MCNC: 18 MG/DL (ref 6–20)
BUN/CREAT SERPL: 21 (ref 12–20)
CALCIUM SERPL-MCNC: 10.2 MG/DL (ref 8.5–10.1)
CHLORIDE SERPL-SCNC: 110 MMOL/L (ref 97–108)
CO2 SERPL-SCNC: 29 MMOL/L (ref 21–32)
CREAT SERPL-MCNC: 0.84 MG/DL (ref 0.55–1.02)
DATE LAST DOSE: ABNORMAL
DIFFERENTIAL METHOD BLD: ABNORMAL
EOSINOPHIL # BLD: 0 K/UL (ref 0–0.4)
EOSINOPHIL NFR BLD: 0 % (ref 0–7)
ERYTHROCYTE [DISTWIDTH] IN BLOOD BY AUTOMATED COUNT: 15.1 % (ref 11.5–14.5)
EST. AVERAGE GLUCOSE BLD GHB EST-MCNC: 105 MG/DL
GLOBULIN SER CALC-MCNC: 3.2 G/DL (ref 2–4)
GLUCOSE SERPL-MCNC: 105 MG/DL (ref 65–100)
HBA1C MFR BLD: 5.3 % (ref 4–5.6)
HCT VFR BLD AUTO: 47.5 % (ref 35–47)
HGB BLD-MCNC: 13.7 G/DL (ref 11.5–16)
IMM GRANULOCYTES # BLD AUTO: 0 K/UL (ref 0–0.04)
IMM GRANULOCYTES NFR BLD AUTO: 0 % (ref 0–0.5)
LITHIUM SERPL-SCNC: 0.51 MMOL/L (ref 0.6–1.2)
LYMPHOCYTES # BLD: 1.6 K/UL (ref 0.8–3.5)
LYMPHOCYTES NFR BLD: 18 % (ref 12–49)
MCH RBC QN AUTO: 29.3 PG (ref 26–34)
MCHC RBC AUTO-ENTMCNC: 28.8 G/DL (ref 30–36.5)
MCV RBC AUTO: 101.5 FL (ref 80–99)
MONOCYTES # BLD: 0.6 K/UL (ref 0–1)
MONOCYTES NFR BLD: 7 % (ref 5–13)
NEUTS SEG # BLD: 6.6 K/UL (ref 1.8–8)
NEUTS SEG NFR BLD: 74 % (ref 32–75)
NRBC # BLD: 0 K/UL (ref 0–0.01)
NRBC BLD-RTO: 0 PER 100 WBC
PLATELET # BLD AUTO: 184 K/UL (ref 150–400)
PMV BLD AUTO: 11.5 FL (ref 8.9–12.9)
POTASSIUM SERPL-SCNC: 4.2 MMOL/L (ref 3.5–5.1)
PROT SERPL-MCNC: 7.5 G/DL (ref 6.4–8.2)
RBC # BLD AUTO: 4.68 M/UL (ref 3.8–5.2)
RBC MORPH BLD: ABNORMAL
RBC MORPH BLD: ABNORMAL
REPORTED DOSE,DOSE: ABNORMAL UNITS
REPORTED DOSE/TIME,TMG: ABNORMAL
SODIUM SERPL-SCNC: 143 MMOL/L (ref 136–145)
TSH SERPL DL<=0.05 MIU/L-ACNC: 1.19 UIU/ML (ref 0.36–3.74)
WBC # BLD AUTO: 8.9 K/UL (ref 3.6–11)

## 2022-05-21 NOTE — PROGRESS NOTES
A1C is normalized on recent labs. Lithium level will be forwarded to psychiatrist.   Kidney function, sodium and other electrolytes are stable. Normal blood counts. Thyroid hormone in target range.      Letter printed to forward results to  Lucila Cano and Carol Whipple at [facility]   [phone]

## 2022-12-01 PROBLEM — Z79.899 ENCOUNTER FOR LONG-TERM (CURRENT) USE OF OTHER MEDICATIONS: Status: ACTIVE | Noted: 2022-12-01

## 2023-12-08 PROBLEM — Z79.899 ENCOUNTER FOR LONG-TERM (CURRENT) USE OF MEDICATIONS: Status: ACTIVE | Noted: 2022-12-01

## 2024-01-08 ENCOUNTER — HOSPITAL ENCOUNTER (OUTPATIENT)
Facility: HOSPITAL | Age: 59
Discharge: HOME | End: 2024-01-11
Attending: INTERNAL MEDICINE
Payer: MEDICARE

## 2024-01-08 DIAGNOSIS — S32.000D COMPRESSION FRACTURE OF LUMBAR VERTEBRA WITH ROUTINE HEALING, UNSPECIFIED LUMBAR VERTEBRAL LEVEL, SUBSEQUENT ENCOUNTER: ICD-10-CM

## 2024-01-08 DIAGNOSIS — Z78.0 ASYMPTOMATIC MENOPAUSAL STATE: ICD-10-CM

## 2024-01-08 PROCEDURE — 77080 DXA BONE DENSITY AXIAL: CPT

## 2024-02-28 ENCOUNTER — TRANSCRIBE ORDERS (OUTPATIENT)
Facility: HOSPITAL | Age: 59
End: 2024-02-28

## 2024-02-28 ENCOUNTER — HOSPITAL ENCOUNTER (OUTPATIENT)
Facility: HOSPITAL | Age: 59
Discharge: HOME | End: 2024-03-02
Attending: INTERNAL MEDICINE
Payer: MEDICARE

## 2024-02-28 VITALS — WEIGHT: 216 LBS | HEIGHT: 60 IN | BODY MASS INDEX: 42.41 KG/M2

## 2024-02-28 DIAGNOSIS — Z12.31 SCREENING MAMMOGRAM FOR BREAST CANCER: ICD-10-CM

## 2024-02-28 DIAGNOSIS — Z12.31 VISIT FOR SCREENING MAMMOGRAM: Primary | ICD-10-CM

## 2024-02-28 PROCEDURE — 77067 SCR MAMMO BI INCL CAD: CPT

## 2024-07-24 ENCOUNTER — TELEPHONE (OUTPATIENT)
Age: 59
End: 2024-07-24

## 2024-07-24 NOTE — TELEPHONE ENCOUNTER
Called patient to inform of schedule change and inform of new time (forty minutes later than originally scheduled time). Left call back number.

## 2024-08-05 ENCOUNTER — OFFICE VISIT (OUTPATIENT)
Age: 59
End: 2024-08-05
Payer: MEDICARE

## 2024-08-05 VITALS
DIASTOLIC BLOOD PRESSURE: 70 MMHG | SYSTOLIC BLOOD PRESSURE: 118 MMHG | WEIGHT: 214 LBS | HEIGHT: 61 IN | BODY MASS INDEX: 40.4 KG/M2

## 2024-08-05 DIAGNOSIS — Z01.419 ENCOUNTER FOR WELL WOMAN EXAM WITH ROUTINE GYNECOLOGICAL EXAM: Primary | ICD-10-CM

## 2024-08-05 PROCEDURE — G0101 CA SCREEN;PELVIC/BREAST EXAM: HCPCS | Performed by: OBSTETRICS & GYNECOLOGY

## 2024-08-05 ASSESSMENT — PATIENT HEALTH QUESTIONNAIRE - PHQ9: DEPRESSION UNABLE TO ASSESS: FUNCTIONAL CAPACITY MOTIVATION LIMITS ACCURACY

## 2024-08-05 NOTE — PROGRESS NOTES
Chief Complaint   Patient presents with    Annual Exam    New Patient     Patient being seen today for annual exam and pap  Listed in chart has having fibroids, patient relays she was not told that.     April Orona is patients step mother and goes with her to her appointments or her mom will go. This is due to her TBI      Ob/Gyn Hx:  G0  Menopause - 15 years ago  Contraception - none  Hx of STI -none  SA -  no    Health maintenance:  Last Pap: 2 years ago, normal per patient  Last Mammogram: 3/6/2024, BI-RADS 1: Negative   Last Bone Density: 1/9/2021,This patient is osteopenic    Last colonoscopy: 1/6/2020    1. Have you been to the ER, urgent care clinic, or hospitalized since your last visit? no    2. Have you seen or consulted any other health care providers outside of the [facility] since your last visit? no    She accepts a chaperone during the gynecologic exam today.      Dary Grant LPN  
colonoscopy.        Ceci Anders MD      
n/a

## 2024-08-09 LAB
CYTOLOGIST CVX/VAG CYTO: NORMAL
CYTOLOGY CVX/VAG DOC CYTO: NORMAL
CYTOLOGY CVX/VAG DOC THIN PREP: NORMAL
DX ICD CODE: NORMAL
HPV GENOTYPE REFLEX: NORMAL
HPV I/H RISK 4 DNA CVX QL PROBE+SIG AMP: NEGATIVE
Lab: NORMAL
OTHER STN SPEC: NORMAL
STAT OF ADQ CVX/VAG CYTO-IMP: NORMAL
